# Patient Record
Sex: FEMALE | Race: WHITE | Employment: OTHER | URBAN - NONMETROPOLITAN AREA
[De-identification: names, ages, dates, MRNs, and addresses within clinical notes are randomized per-mention and may not be internally consistent; named-entity substitution may affect disease eponyms.]

---

## 2018-03-20 ENCOUNTER — APPOINTMENT (OUTPATIENT)
Dept: GENERAL RADIOLOGY | Age: 83
End: 2018-03-20
Payer: MEDICARE

## 2018-03-20 ENCOUNTER — HOSPITAL ENCOUNTER (EMERGENCY)
Age: 83
Discharge: HOME | End: 2018-03-20
Attending: EMERGENCY MEDICINE
Payer: MEDICARE

## 2018-03-20 VITALS
WEIGHT: 174 LBS | TEMPERATURE: 96.9 F | OXYGEN SATURATION: 95 % | DIASTOLIC BLOOD PRESSURE: 67 MMHG | RESPIRATION RATE: 16 BRPM | BODY MASS INDEX: 32.08 KG/M2 | SYSTOLIC BLOOD PRESSURE: 155 MMHG | HEART RATE: 63 BPM

## 2018-03-20 DIAGNOSIS — M25.552 LEFT HIP PAIN: Primary | ICD-10-CM

## 2018-03-20 PROCEDURE — 6370000000 HC RX 637 (ALT 250 FOR IP): Performed by: EMERGENCY MEDICINE

## 2018-03-20 PROCEDURE — 96375 TX/PRO/DX INJ NEW DRUG ADDON: CPT

## 2018-03-20 PROCEDURE — 73521 X-RAY EXAM HIPS BI 2 VIEWS: CPT

## 2018-03-20 PROCEDURE — 99284 EMERGENCY DEPT VISIT MOD MDM: CPT

## 2018-03-20 PROCEDURE — 96374 THER/PROPH/DIAG INJ IV PUSH: CPT

## 2018-03-20 PROCEDURE — 6360000002 HC RX W HCPCS: Performed by: EMERGENCY MEDICINE

## 2018-03-20 RX ORDER — HYDROCODONE BITARTRATE AND ACETAMINOPHEN 5; 325 MG/1; MG/1
1 TABLET ORAL EVERY 4 HOURS PRN
Qty: 10 TABLET | Refills: 0 | Status: SHIPPED | OUTPATIENT
Start: 2018-03-20 | End: 2018-03-23

## 2018-03-20 RX ORDER — HYDROCODONE BITARTRATE AND ACETAMINOPHEN 5; 325 MG/1; MG/1
1 TABLET ORAL ONCE
Status: COMPLETED | OUTPATIENT
Start: 2018-03-20 | End: 2018-03-20

## 2018-03-20 RX ORDER — ONDANSETRON 2 MG/ML
4 INJECTION INTRAMUSCULAR; INTRAVENOUS ONCE
Status: COMPLETED | OUTPATIENT
Start: 2018-03-20 | End: 2018-03-20

## 2018-03-20 RX ORDER — MORPHINE SULFATE 2 MG/ML
2 INJECTION, SOLUTION INTRAMUSCULAR; INTRAVENOUS ONCE
Status: COMPLETED | OUTPATIENT
Start: 2018-03-20 | End: 2018-03-20

## 2018-03-20 RX ADMIN — MORPHINE SULFATE 2 MG: 2 INJECTION, SOLUTION INTRAMUSCULAR; INTRAVENOUS at 09:02

## 2018-03-20 RX ADMIN — HYDROCODONE BITARTRATE AND ACETAMINOPHEN 1 TABLET: 5; 325 TABLET ORAL at 11:14

## 2018-03-20 RX ADMIN — ONDANSETRON 4 MG: 2 INJECTION INTRAMUSCULAR; INTRAVENOUS at 09:04

## 2018-03-20 ASSESSMENT — ENCOUNTER SYMPTOMS
VOMITING: 0
WHEEZING: 0
CONSTIPATION: 0
ABDOMINAL PAIN: 0
BACK PAIN: 0
COUGH: 0
FACIAL SWELLING: 0
CHEST TIGHTNESS: 0
DIARRHEA: 0
ABDOMINAL DISTENTION: 0
NAUSEA: 1
COLOR CHANGE: 0
SHORTNESS OF BREATH: 0

## 2018-03-20 ASSESSMENT — PAIN DESCRIPTION - DESCRIPTORS: DESCRIPTORS: SPASM

## 2018-03-20 ASSESSMENT — PAIN SCALES - GENERAL
PAINLEVEL_OUTOF10: 6
PAINLEVEL_OUTOF10: 10

## 2018-03-20 ASSESSMENT — PAIN DESCRIPTION - PAIN TYPE: TYPE: ACUTE PAIN

## 2018-03-20 ASSESSMENT — PAIN DESCRIPTION - LOCATION: LOCATION: HIP

## 2018-03-20 ASSESSMENT — PAIN DESCRIPTION - ORIENTATION: ORIENTATION: LEFT

## 2018-03-24 ENCOUNTER — HOSPITAL ENCOUNTER (EMERGENCY)
Age: 83
Discharge: ANOTHER HOSPITAL | End: 2018-03-24
Attending: EMERGENCY MEDICINE
Payer: MEDICARE

## 2018-03-24 ENCOUNTER — APPOINTMENT (OUTPATIENT)
Dept: GENERAL RADIOLOGY | Age: 83
End: 2018-03-24
Payer: MEDICARE

## 2018-03-24 ENCOUNTER — HOSPITAL ENCOUNTER (INPATIENT)
Age: 83
LOS: 3 days | Discharge: HOME | DRG: 247 | End: 2018-03-27
Attending: INTERNAL MEDICINE | Admitting: INTERNAL MEDICINE
Payer: MEDICARE

## 2018-03-24 VITALS
WEIGHT: 180 LBS | TEMPERATURE: 96.1 F | RESPIRATION RATE: 18 BRPM | DIASTOLIC BLOOD PRESSURE: 49 MMHG | OXYGEN SATURATION: 95 % | HEART RATE: 91 BPM | BODY MASS INDEX: 33.19 KG/M2 | SYSTOLIC BLOOD PRESSURE: 119 MMHG

## 2018-03-24 DIAGNOSIS — J81.0 ACUTE PULMONARY EDEMA (HCC): ICD-10-CM

## 2018-03-24 DIAGNOSIS — R07.9 CHEST PAIN, UNSPECIFIED TYPE: Primary | ICD-10-CM

## 2018-03-24 PROBLEM — I24.9 ACUTE CORONARY SYNDROME (HCC): Status: ACTIVE | Noted: 2018-03-24

## 2018-03-24 LAB
ABSOLUTE EOS #: <0.03 K/UL (ref 0–0.44)
ABSOLUTE IMMATURE GRANULOCYTE: 0.06 K/UL (ref 0–0.3)
ABSOLUTE LYMPH #: 2.75 K/UL (ref 1.1–3.7)
ABSOLUTE MONO #: 1.04 K/UL (ref 0.1–1.2)
ANION GAP SERPL CALCULATED.3IONS-SCNC: 19 MMOL/L (ref 9–17)
BASOPHILS # BLD: 0 % (ref 0–2)
BASOPHILS ABSOLUTE: <0.03 K/UL (ref 0–0.2)
BNP INTERPRETATION: ABNORMAL
BNP INTERPRETATION: ABNORMAL
BUN BLDV-MCNC: 21 MG/DL (ref 8–23)
BUN/CREAT BLD: 14 (ref 9–20)
CALCIUM SERPL-MCNC: 10.1 MG/DL (ref 8.6–10.4)
CHLORIDE BLD-SCNC: 90 MMOL/L (ref 98–107)
CO2: 20 MMOL/L (ref 20–31)
CREAT SERPL-MCNC: 1.52 MG/DL (ref 0.5–0.9)
DIFFERENTIAL TYPE: ABNORMAL
EKG ATRIAL RATE: 112 BPM
EKG ATRIAL RATE: 140 BPM
EKG P AXIS: 43 DEGREES
EKG P AXIS: 44 DEGREES
EKG P-R INTERVAL: 174 MS
EKG P-R INTERVAL: 196 MS
EKG Q-T INTERVAL: 316 MS
EKG Q-T INTERVAL: 352 MS
EKG QRS DURATION: 102 MS
EKG QRS DURATION: 98 MS
EKG QTC CALCULATION (BAZETT): 431 MS
EKG QTC CALCULATION (BAZETT): 537 MS
EKG R AXIS: -12 DEGREES
EKG R AXIS: -21 DEGREES
EKG T AXIS: 117 DEGREES
EKG T AXIS: 96 DEGREES
EKG VENTRICULAR RATE: 112 BPM
EKG VENTRICULAR RATE: 140 BPM
EOSINOPHILS RELATIVE PERCENT: 0 % (ref 1–4)
GFR AFRICAN AMERICAN: 40 ML/MIN
GFR NON-AFRICAN AMERICAN: 33 ML/MIN
GFR SERPL CREATININE-BSD FRML MDRD: ABNORMAL ML/MIN/{1.73_M2}
GFR SERPL CREATININE-BSD FRML MDRD: ABNORMAL ML/MIN/{1.73_M2}
GLUCOSE BLD-MCNC: 119 MG/DL (ref 65–105)
GLUCOSE BLD-MCNC: 134 MG/DL (ref 65–105)
GLUCOSE BLD-MCNC: 144 MG/DL (ref 65–105)
GLUCOSE BLD-MCNC: 190 MG/DL (ref 65–105)
GLUCOSE BLD-MCNC: 235 MG/DL (ref 65–105)
GLUCOSE BLD-MCNC: 364 MG/DL (ref 74–100)
GLUCOSE BLD-MCNC: 378 MG/DL (ref 74–100)
GLUCOSE BLD-MCNC: 404 MG/DL (ref 70–99)
HCT VFR BLD CALC: 30.5 % (ref 36.3–47.1)
HCT VFR BLD CALC: 35.6 % (ref 36.3–47.1)
HEMOGLOBIN: 11.4 G/DL (ref 11.9–15.1)
HEMOGLOBIN: 9.8 G/DL (ref 11.9–15.1)
IMMATURE GRANULOCYTES: 0 %
INR BLD: 1 (ref 0.9–1.2)
LV EF: 30 %
LVEF MODALITY: NORMAL
LYMPHOCYTES # BLD: 17 % (ref 24–43)
MAGNESIUM: 1.7 MG/DL (ref 1.6–2.6)
MCH RBC QN AUTO: 30.2 PG (ref 25.2–33.5)
MCH RBC QN AUTO: 30.4 PG (ref 25.2–33.5)
MCHC RBC AUTO-ENTMCNC: 32 G/DL (ref 28.4–34.8)
MCHC RBC AUTO-ENTMCNC: 32.1 G/DL (ref 28.4–34.8)
MCV RBC AUTO: 93.8 FL (ref 82.6–102.9)
MCV RBC AUTO: 94.9 FL (ref 82.6–102.9)
MONOCYTES # BLD: 7 % (ref 3–12)
NRBC AUTOMATED: 0 PER 100 WBC
NRBC AUTOMATED: 0 PER 100 WBC
PARTIAL THROMBOPLASTIN TIME: 22.3 SEC (ref 20.5–30.5)
PARTIAL THROMBOPLASTIN TIME: 25 SEC (ref 23.2–34.4)
PARTIAL THROMBOPLASTIN TIME: 90.9 SEC (ref 20.5–30.5)
PDW BLD-RTO: 12.4 % (ref 11.8–14.4)
PDW BLD-RTO: 12.4 % (ref 11.8–14.4)
PLATELET # BLD: 249 K/UL (ref 138–453)
PLATELET # BLD: 364 K/UL (ref 138–453)
PLATELET ESTIMATE: ABNORMAL
PMV BLD AUTO: 10 FL (ref 8.1–13.5)
PMV BLD AUTO: 9.5 FL (ref 8.1–13.5)
POTASSIUM SERPL-SCNC: 4.9 MMOL/L (ref 3.7–5.3)
PRO-BNP: 9595 PG/ML
PRO-BNP: ABNORMAL PG/ML
PROTHROMBIN TIME: 10.9 SEC (ref 9.7–12.2)
RBC # BLD: 3.25 M/UL (ref 3.95–5.11)
RBC # BLD: 3.75 M/UL (ref 3.95–5.11)
RBC # BLD: ABNORMAL 10*6/UL
SEG NEUTROPHILS: 76 % (ref 36–65)
SEGMENTED NEUTROPHILS ABSOLUTE COUNT: 11.89 K/UL (ref 1.5–8.1)
SODIUM BLD-SCNC: 129 MMOL/L (ref 135–144)
TROPONIN INTERP: ABNORMAL
TROPONIN T: 0.14 NG/ML
TROPONIN T: 1.13 NG/ML
TROPONIN T: 1.44 NG/ML
WBC # BLD: 14.4 K/UL (ref 3.5–11.3)
WBC # BLD: 15.8 K/UL (ref 3.5–11.3)
WBC # BLD: ABNORMAL 10*3/UL

## 2018-03-24 PROCEDURE — 6360000002 HC RX W HCPCS: Performed by: INTERNAL MEDICINE

## 2018-03-24 PROCEDURE — 71045 X-RAY EXAM CHEST 1 VIEW: CPT

## 2018-03-24 PROCEDURE — 85730 THROMBOPLASTIN TIME PARTIAL: CPT

## 2018-03-24 PROCEDURE — 82947 ASSAY GLUCOSE BLOOD QUANT: CPT

## 2018-03-24 PROCEDURE — 36415 COLL VENOUS BLD VENIPUNCTURE: CPT

## 2018-03-24 PROCEDURE — 6370000000 HC RX 637 (ALT 250 FOR IP): Performed by: INTERNAL MEDICINE

## 2018-03-24 PROCEDURE — 85027 COMPLETE CBC AUTOMATED: CPT

## 2018-03-24 PROCEDURE — 96375 TX/PRO/DX INJ NEW DRUG ADDON: CPT

## 2018-03-24 PROCEDURE — 85610 PROTHROMBIN TIME: CPT

## 2018-03-24 PROCEDURE — 96374 THER/PROPH/DIAG INJ IV PUSH: CPT

## 2018-03-24 PROCEDURE — 85025 COMPLETE CBC W/AUTO DIFF WBC: CPT

## 2018-03-24 PROCEDURE — 84484 ASSAY OF TROPONIN QUANT: CPT

## 2018-03-24 PROCEDURE — 99285 EMERGENCY DEPT VISIT HI MDM: CPT

## 2018-03-24 PROCEDURE — 83880 ASSAY OF NATRIURETIC PEPTIDE: CPT

## 2018-03-24 PROCEDURE — 96372 THER/PROPH/DIAG INJ SC/IM: CPT

## 2018-03-24 PROCEDURE — 2060000000 HC ICU INTERMEDIATE R&B

## 2018-03-24 PROCEDURE — 93005 ELECTROCARDIOGRAM TRACING: CPT

## 2018-03-24 PROCEDURE — 6360000002 HC RX W HCPCS: Performed by: STUDENT IN AN ORGANIZED HEALTH CARE EDUCATION/TRAINING PROGRAM

## 2018-03-24 PROCEDURE — 6370000000 HC RX 637 (ALT 250 FOR IP): Performed by: EMERGENCY MEDICINE

## 2018-03-24 PROCEDURE — 93306 TTE W/DOPPLER COMPLETE: CPT

## 2018-03-24 PROCEDURE — 6360000002 HC RX W HCPCS: Performed by: EMERGENCY MEDICINE

## 2018-03-24 PROCEDURE — 94762 N-INVAS EAR/PLS OXIMTRY CONT: CPT

## 2018-03-24 PROCEDURE — 80048 BASIC METABOLIC PNL TOTAL CA: CPT

## 2018-03-24 PROCEDURE — 83735 ASSAY OF MAGNESIUM: CPT

## 2018-03-24 RX ORDER — MORPHINE SULFATE 4 MG/ML
4 INJECTION, SOLUTION INTRAMUSCULAR; INTRAVENOUS EVERY 4 HOURS PRN
Status: DISCONTINUED | OUTPATIENT
Start: 2018-03-24 | End: 2018-03-24

## 2018-03-24 RX ORDER — ALBUTEROL SULFATE 90 UG/1
2 AEROSOL, METERED RESPIRATORY (INHALATION) EVERY 4 HOURS PRN
Status: DISCONTINUED | OUTPATIENT
Start: 2018-03-24 | End: 2018-03-27 | Stop reason: HOSPADM

## 2018-03-24 RX ORDER — FUROSEMIDE 10 MG/ML
80 INJECTION INTRAMUSCULAR; INTRAVENOUS ONCE
Status: COMPLETED | OUTPATIENT
Start: 2018-03-24 | End: 2018-03-24

## 2018-03-24 RX ORDER — HEPARIN SODIUM 1000 [USP'U]/ML
4000 INJECTION, SOLUTION INTRAVENOUS; SUBCUTANEOUS ONCE
Status: COMPLETED | OUTPATIENT
Start: 2018-03-24 | End: 2018-03-24

## 2018-03-24 RX ORDER — HEPARIN SODIUM 1000 [USP'U]/ML
2000 INJECTION, SOLUTION INTRAVENOUS; SUBCUTANEOUS PRN
Status: DISCONTINUED | OUTPATIENT
Start: 2018-03-24 | End: 2018-03-26

## 2018-03-24 RX ORDER — ONDANSETRON 2 MG/ML
4 INJECTION INTRAMUSCULAR; INTRAVENOUS ONCE
Status: COMPLETED | OUTPATIENT
Start: 2018-03-24 | End: 2018-03-24

## 2018-03-24 RX ORDER — ACETAMINOPHEN 325 MG/1
650 TABLET ORAL EVERY 4 HOURS PRN
Status: DISCONTINUED | OUTPATIENT
Start: 2018-03-24 | End: 2018-03-27 | Stop reason: HOSPADM

## 2018-03-24 RX ORDER — TROLAMINE SALICYLATE 10 G/100G
CREAM TOPICAL 2 TIMES DAILY PRN
Status: DISCONTINUED | OUTPATIENT
Start: 2018-03-24 | End: 2018-03-27 | Stop reason: HOSPADM

## 2018-03-24 RX ORDER — HEPARIN SODIUM 10000 [USP'U]/100ML
12 INJECTION, SOLUTION INTRAVENOUS CONTINUOUS
Status: DISCONTINUED | OUTPATIENT
Start: 2018-03-24 | End: 2018-03-26

## 2018-03-24 RX ORDER — FUROSEMIDE 10 MG/ML
20 INJECTION INTRAMUSCULAR; INTRAVENOUS ONCE
Status: COMPLETED | OUTPATIENT
Start: 2018-03-24 | End: 2018-03-24

## 2018-03-24 RX ORDER — SIMVASTATIN 40 MG
40 TABLET ORAL NIGHTLY
Status: DISCONTINUED | OUTPATIENT
Start: 2018-03-24 | End: 2018-03-26

## 2018-03-24 RX ORDER — LORAZEPAM 2 MG/ML
0.5 INJECTION INTRAMUSCULAR ONCE
Status: COMPLETED | OUTPATIENT
Start: 2018-03-24 | End: 2018-03-24

## 2018-03-24 RX ORDER — DEXTROSE MONOHYDRATE 25 G/50ML
12.5 INJECTION, SOLUTION INTRAVENOUS PRN
Status: DISCONTINUED | OUTPATIENT
Start: 2018-03-24 | End: 2018-03-27 | Stop reason: HOSPADM

## 2018-03-24 RX ORDER — METOPROLOL TARTRATE 50 MG/1
50 TABLET, FILM COATED ORAL DAILY
Status: DISCONTINUED | OUTPATIENT
Start: 2018-03-24 | End: 2018-03-27 | Stop reason: HOSPADM

## 2018-03-24 RX ORDER — PANTOPRAZOLE SODIUM 40 MG/1
40 TABLET, DELAYED RELEASE ORAL
Status: DISCONTINUED | OUTPATIENT
Start: 2018-03-24 | End: 2018-03-27 | Stop reason: HOSPADM

## 2018-03-24 RX ORDER — HEPARIN SODIUM 1000 [USP'U]/ML
4000 INJECTION, SOLUTION INTRAVENOUS; SUBCUTANEOUS PRN
Status: DISCONTINUED | OUTPATIENT
Start: 2018-03-24 | End: 2018-03-26

## 2018-03-24 RX ORDER — ASPIRIN 81 MG/1
81 TABLET, CHEWABLE ORAL DAILY
Status: DISCONTINUED | OUTPATIENT
Start: 2018-03-24 | End: 2018-03-27 | Stop reason: HOSPADM

## 2018-03-24 RX ORDER — NICOTINE POLACRILEX 4 MG
15 LOZENGE BUCCAL PRN
Status: DISCONTINUED | OUTPATIENT
Start: 2018-03-24 | End: 2018-03-27 | Stop reason: HOSPADM

## 2018-03-24 RX ORDER — MORPHINE SULFATE 4 MG/ML
4 INJECTION, SOLUTION INTRAMUSCULAR; INTRAVENOUS EVERY 4 HOURS PRN
Status: DISCONTINUED | OUTPATIENT
Start: 2018-03-24 | End: 2018-03-27 | Stop reason: HOSPADM

## 2018-03-24 RX ORDER — DEXTROSE MONOHYDRATE 50 MG/ML
100 INJECTION, SOLUTION INTRAVENOUS PRN
Status: DISCONTINUED | OUTPATIENT
Start: 2018-03-24 | End: 2018-03-27 | Stop reason: HOSPADM

## 2018-03-24 RX ORDER — MORPHINE SULFATE 2 MG/ML
2 INJECTION, SOLUTION INTRAMUSCULAR; INTRAVENOUS EVERY 4 HOURS PRN
Status: DISCONTINUED | OUTPATIENT
Start: 2018-03-24 | End: 2018-03-27 | Stop reason: HOSPADM

## 2018-03-24 RX ORDER — TRAMADOL HYDROCHLORIDE 50 MG/1
50 TABLET ORAL EVERY 6 HOURS PRN
Status: DISCONTINUED | OUTPATIENT
Start: 2018-03-24 | End: 2018-03-24

## 2018-03-24 RX ORDER — ACETAMINOPHEN 325 MG/1
650 TABLET ORAL ONCE
Status: COMPLETED | OUTPATIENT
Start: 2018-03-24 | End: 2018-03-24

## 2018-03-24 RX ADMIN — INSULIN LISPRO 1 UNITS: 100 INJECTION, SOLUTION INTRAVENOUS; SUBCUTANEOUS at 22:50

## 2018-03-24 RX ADMIN — ONDANSETRON 4 MG: 2 INJECTION INTRAMUSCULAR; INTRAVENOUS at 00:53

## 2018-03-24 RX ADMIN — HEPARIN SODIUM 4000 UNITS: 1000 INJECTION, SOLUTION INTRAVENOUS; SUBCUTANEOUS at 11:28

## 2018-03-24 RX ADMIN — METOPROLOL TARTRATE 25 MG: 50 TABLET, FILM COATED ORAL at 11:59

## 2018-03-24 RX ADMIN — FUROSEMIDE 80 MG: 10 INJECTION, SOLUTION INTRAMUSCULAR; INTRAVENOUS at 11:42

## 2018-03-24 RX ADMIN — SIMVASTATIN 40 MG: 40 TABLET, FILM COATED ORAL at 20:22

## 2018-03-24 RX ADMIN — ACETAMINOPHEN 650 MG: 325 TABLET, FILM COATED ORAL at 03:15

## 2018-03-24 RX ADMIN — HEPARIN SODIUM AND DEXTROSE 12 UNITS/KG/HR: 10000; 5 INJECTION INTRAVENOUS at 11:30

## 2018-03-24 RX ADMIN — MORPHINE SULFATE 4 MG: 4 INJECTION INTRAVENOUS at 16:34

## 2018-03-24 RX ADMIN — LORAZEPAM 0.5 MG: 2 INJECTION INTRAMUSCULAR; INTRAVENOUS at 00:56

## 2018-03-24 RX ADMIN — ACETAMINOPHEN 650 MG: 325 TABLET ORAL at 08:43

## 2018-03-24 RX ADMIN — PANTOPRAZOLE SODIUM 40 MG: 40 TABLET, DELAYED RELEASE ORAL at 11:59

## 2018-03-24 RX ADMIN — FUROSEMIDE 20 MG: 10 INJECTION, SOLUTION INTRAMUSCULAR; INTRAVENOUS at 01:50

## 2018-03-24 RX ADMIN — ACETAMINOPHEN 650 MG: 325 TABLET ORAL at 14:41

## 2018-03-24 RX ADMIN — TRAMADOL HYDROCHLORIDE 50 MG: 50 TABLET, FILM COATED ORAL at 13:40

## 2018-03-24 RX ADMIN — TROLAMINE SALICYLATE: 8.5 CREAM TOPICAL at 15:28

## 2018-03-24 RX ADMIN — INSULIN HUMAN 8 UNITS: 100 INJECTION, SOLUTION PARENTERAL at 01:50

## 2018-03-24 ASSESSMENT — PAIN SCALES - GENERAL
PAINLEVEL_OUTOF10: 4
PAINLEVEL_OUTOF10: 6
PAINLEVEL_OUTOF10: 5
PAINLEVEL_OUTOF10: 10
PAINLEVEL_OUTOF10: 6

## 2018-03-24 ASSESSMENT — PAIN DESCRIPTION - PAIN TYPE
TYPE: ACUTE PAIN
TYPE: ACUTE PAIN

## 2018-03-24 ASSESSMENT — PAIN DESCRIPTION - LOCATION
LOCATION: CHEST
LOCATION: CHEST

## 2018-03-24 NOTE — H&P
[facility]    Consult / H&P               Today's Date: 3/24/2018  Patient Name: Andrea Yang  Date of admission: 3/24/2018  4:44 AM  Patient's age: 81 y.o., 1935  Admission Dx: Acute coronary syndrome ([facility] [address] [I24.9]    Reason for Consult:  Cardiac evaluation    Requesting Physician: Isa Pineda MD    CHIEF COMPLAINT:  Chest pain/sob    History Obtained From:  patient, electronic medical record    HISTORY OF PRESENT ILLNESS:      The patient is a 81 y.o.  female who is admitted to the hospital for Chest Pain and shortness of breath. Andrea Yang complains of chest pain. Onset was 1 day ago. Symptoms have improved since that time. The patient's pain was constant. The patient describes the pain as sharp and does not radiate. Intensity 10/10. Associated symptoms are: shortness of breath and nausea. Aggravating factors are: movement or exertion. She reports symptoms are similar to previous heart attack. Patient's cardiac risk factors are: advanced age (older than 54 for men, 72 for women), diabetes mellitus, dyslipidemia, hypertension and obesity (BMI >= 30 kg/m2). Upon EMS arrival- patient was hypoxic in low 80s. At [place] [address] ED, patient was placed on biPAP and saturations improved. CXR showing interstitial edema vs nonspecific postinflammatory change. Labs show Na 129, Cr 1.52, proBNP 9595, troponin 0.14. EKG showing sinus tachy w/ PVC, LVH, Q waves in inferior leads. Patient has history of CAD s/p 2 stents (2002). Follows with Dr. Chong Ricketts. Per patient, last stress test in November 2017 which was normal.     Past Medical History:   has a past medical history of Arthritis; CAD (coronary artery disease); Diabetes mellitus ([facility] [address] GERD (gastroesophageal reflux disease); Hyperlipidemia; Hypertension; and Unspecified cerebral artery occlusion with cerebral infarction.     Past Surgical History:   has a past surgical history that includes Leg Surgery (Bilateral); · Constitutional: no fevers/chills      · Eyes: No visual changes or diplopia. No scleral icterus. · ENT: No Headaches  · Cardiovascular: +chest pain  · Respiratory: +sob No previous pulmonary problems, No cough  · Gastrointestinal: +nausea No abdominal pain. No change in bowel or bladder habits. · Genitourinary: No dysuria, trouble voiding, or hematuria. · Musculoskeletal: +left leg pain No gait disturbance, No weakness or joint complaints. · Integumentary: No rash or pruritis. · Neurological: No headache, diplopia, change in muscle strength, numbness or tingling. · Psychiatric: No anxiety, or depression. · Endocrine: No temperature intolerance. No excessive thirst, fluid intake, or urination. No tremor. · Hematologic/Lymphatic: No abnormal bruising or bleeding, blood clots or swollen lymph nodes. · Allergic/Immunologic: No nasal congestion or hives. PHYSICAL EXAM:      /85   Pulse 89   Temp 98 °F (36.7 °C) (Oral)   Resp 17   Ht 5' 5\" (1.651 m)   Wt 183 lb 1 oz (83 kg)   SpO2 99%   BMI 30.46 kg/m²    Constitutional and General Appearance: alert, cooperative, no distress and appears stated age  HEENT: PERRL, mucous membranes dry  Respiratory:  · Normal excursion and expansion without use of accessory muscles  · Resp Auscultation: Good respiratory effort. No for increased work of breathing. · Basilar crackles bilaterally  Cardiovascular:  · Heart tones are crisp and normal. regular S1 and S2  · Chest tender to palpation. · Peripheral pulses are symmetrical and full   Abdomen:   · No masses or tenderness  · Bowel sounds present  Extremities:  ·  No Cyanosis or Clubbing  ·  + bilateral lower extremity edema  ·  Skin: Warm and dry  Neurological:  · Alert and oriented. · Moves all extremities well  · No abnormalities of mood, affect, memory, mentation, or behavior are noted    DATA:    Diagnostics:    EKG: NSR, LVH, Q waves inferior leads  ECHO: ordered, but not yet obtained.      Stress

## 2018-03-24 NOTE — ED NOTES
Per Dr Stanley Wells not given at this time due to pt's BP of 106/54.        Paty Sen, RN  03/24/18 0157

## 2018-03-24 NOTE — ED PROVIDER NOTES
[facility]  Emergency Medicine Department    Pt Name: Brittani Colmenares  MRN: [id number]  [place] 1935  Date of evaluation: 3/24/2018  Provider: Jeffery Genao MD    CHIEF COMPLAINT       Chief Complaint   Patient presents with    Chest Pain     onsest around 11 pm       HISTORY OF PRESENT ILLNESS  (Location/Symptom, Timing/Onset, Context/Setting, Quality, Duration, Modifying Factors, Severity.)   Brittani Colmenares is a 81 y.o. female with a history of coronary artery disease who presents to the emergency department with sudden onset shortness of breath and chest pain that started around 11 PM.  She arrives by EMS. She was initially found to be hypoxic in the low 80s which improved after an albuterol breathing treatment. She has no history of COPD or CHF. She reports chest pain that feels like when she had a heart attack about 15 years ago. She took aspirin earlier in the evening. She reports her chest pain is a 10 out of 10. She denies any fevers or coughing. Nursing Notes were reviewed. ALLERGIES     Patient has no known allergies.     CURRENT MEDICATIONS       Previous Medications    ASPIRIN 81 MG TABLET    Take 81 mg by mouth daily    ESOMEPRAZOLE MAGNESIUM (NEXIUM) 40 MG PACK    Take 40 mg by mouth daily    GLIPIZIDE (GLUCOTROL) 5 MG TABLET    Take 5 mg by mouth daily    METFORMIN (GLUCOPHAGE) 1000 MG TABLET    Take 1,000 mg by mouth daily (with breakfast)    METOPROLOL (TOPROL-XL) 50 MG XL TABLET    Take 50 mg by mouth daily    SIMVASTATIN (ZOCOR) 40 MG TABLET    Take 40 mg by mouth nightly       PAST MEDICAL HISTORY         Diagnosis Date    Arthritis     CAD (coronary artery disease)     Diabetes mellitus ([address]     GERD (gastroesophageal reflux disease)     Hyperlipidemia     Hypertension     Unspecified cerebral artery occlusion with cerebral infarction 2003       SURGICAL HISTORY           Procedure Laterality Date    CARDIAC SURGERY      stents    CHOLECYSTECTOMY normal range or not returned as of this dictation. EMERGENCY DEPARTMENT COURSE and DIFFERENTIAL DIAGNOSIS/MDM:   Vitals:    Vitals:    03/24/18 0040 03/24/18 0042 03/24/18 0049 03/24/18 0101   BP: 134/74  (!) 146/80    Pulse: 133  118    Resp: 24 28 22    Temp:    96.1 °F (35.6 °C)   TempSrc:    Tympanic   SpO2: 97%  100%    Weight:         81-year-old female with a history of coronary artery disease but with no history of CHF presenting with sudden onset chest pain and shortness of breath. EKG initially showed tachycardia with frequent PVCs. After being placed on BiPAP, her heart rate improved and EKG became more regular with findings consistent with LVH but no acute STEMI. Her chest pain improved after a dose of Ativan and with BiPAP her O2 saturations improved from the low 80s to 100%. Her pulmonary exam and chest x-ray are consistent with pulmonary edema. Her troponin is mildly elevated which I believe is secondary to demand but an STEMI cannot be ruled out. She is oriented and treated with a dose of aspirin at home. Discussed her EKG and clinical presentation with Dr. Cheikh Awan at [facility]. [facility] who reviewed the EKGs and agrees to accept the patient at [facility].    CONSULTS:  None    CRITICAL CARE TIME     Due to the high probability of sudden and clinically significant deterioration in the patient's condition she required highest level of my preparedness to intervene urgently. I provided critical care time including documentation time, medication orders and management, reevaluation, vital sign assessment, ordering and reviewing of of lab tests ordering and reviewing of x-ray studies, and admission orders. Aggregate critical care time is between 60-75 minutes including only time during which I was engaged in work directly related to her care and did not include time spent treating other patients simultaneously. FINAL IMPRESSION      1. Chest pain, unspecified type    2.  Acute pulmonary edema

## 2018-03-24 NOTE — ED NOTES
[facility] in department, listened to report as this writer was giving it to [facility]. V's.   Pt report given to Oj at [facility], RN  03/24/18 6397

## 2018-03-24 NOTE — ED NOTES
Pt states that she has a headache and is requesting some Tylenol.   Dr Micheline Castrejon made aware     Giselle Varma, ZENON  03/24/18 7864

## 2018-03-25 LAB
CHOLESTEROL, FASTING: 143 MG/DL
CHOLESTEROL/HDL RATIO: 2
EKG ATRIAL RATE: 74 BPM
EKG P AXIS: 46 DEGREES
EKG P-R INTERVAL: 168 MS
EKG Q-T INTERVAL: 450 MS
EKG QRS DURATION: 102 MS
EKG QTC CALCULATION (BAZETT): 499 MS
EKG R AXIS: -5 DEGREES
EKG T AXIS: 163 DEGREES
EKG VENTRICULAR RATE: 74 BPM
GLUCOSE BLD-MCNC: 159 MG/DL (ref 65–105)
GLUCOSE BLD-MCNC: 161 MG/DL (ref 65–105)
GLUCOSE BLD-MCNC: 181 MG/DL (ref 65–105)
GLUCOSE BLD-MCNC: 186 MG/DL (ref 65–105)
HDLC SERPL-MCNC: 71 MG/DL
LDL CHOLESTEROL: 48 MG/DL (ref 0–130)
PARTIAL THROMBOPLASTIN TIME: 41.7 SEC (ref 20.5–30.5)
PARTIAL THROMBOPLASTIN TIME: 44.6 SEC (ref 20.5–30.5)
PARTIAL THROMBOPLASTIN TIME: 48.4 SEC (ref 20.5–30.5)
PARTIAL THROMBOPLASTIN TIME: 78.7 SEC (ref 20.5–30.5)
TRIGLYCERIDE, FASTING: 122 MG/DL
TROPONIN INTERP: ABNORMAL
TROPONIN T: 1.13 NG/ML
TROPONIN T: 1.18 NG/ML
TROPONIN T: 1.2 NG/ML
VLDLC SERPL CALC-MCNC: NORMAL MG/DL (ref 1–30)

## 2018-03-25 PROCEDURE — 6370000000 HC RX 637 (ALT 250 FOR IP): Performed by: INTERNAL MEDICINE

## 2018-03-25 PROCEDURE — 93005 ELECTROCARDIOGRAM TRACING: CPT

## 2018-03-25 PROCEDURE — 2060000000 HC ICU INTERMEDIATE R&B

## 2018-03-25 PROCEDURE — 80061 LIPID PANEL: CPT

## 2018-03-25 PROCEDURE — 36415 COLL VENOUS BLD VENIPUNCTURE: CPT

## 2018-03-25 PROCEDURE — 6360000002 HC RX W HCPCS: Performed by: STUDENT IN AN ORGANIZED HEALTH CARE EDUCATION/TRAINING PROGRAM

## 2018-03-25 PROCEDURE — 6360000002 HC RX W HCPCS: Performed by: INTERNAL MEDICINE

## 2018-03-25 PROCEDURE — 82947 ASSAY GLUCOSE BLOOD QUANT: CPT

## 2018-03-25 PROCEDURE — 84484 ASSAY OF TROPONIN QUANT: CPT

## 2018-03-25 PROCEDURE — 85730 THROMBOPLASTIN TIME PARTIAL: CPT

## 2018-03-25 RX ORDER — ONDANSETRON 2 MG/ML
4 INJECTION INTRAMUSCULAR; INTRAVENOUS EVERY 6 HOURS PRN
Status: DISCONTINUED | OUTPATIENT
Start: 2018-03-25 | End: 2018-03-27 | Stop reason: HOSPADM

## 2018-03-25 RX ORDER — PROMETHAZINE HYDROCHLORIDE 25 MG/1
25 TABLET ORAL EVERY 6 HOURS PRN
Status: DISCONTINUED | OUTPATIENT
Start: 2018-03-25 | End: 2018-03-27 | Stop reason: HOSPADM

## 2018-03-25 RX ADMIN — HEPARIN SODIUM 2000 UNITS: 1000 INJECTION, SOLUTION INTRAVENOUS; SUBCUTANEOUS at 18:38

## 2018-03-25 RX ADMIN — ONDANSETRON 4 MG: 2 INJECTION INTRAMUSCULAR; INTRAVENOUS at 00:25

## 2018-03-25 RX ADMIN — HEPARIN SODIUM AND DEXTROSE 11.04 UNITS/KG/HR: 10000; 5 INJECTION INTRAVENOUS at 18:31

## 2018-03-25 RX ADMIN — PROMETHAZINE HYDROCHLORIDE 25 MG: 25 TABLET ORAL at 12:40

## 2018-03-25 RX ADMIN — INSULIN LISPRO 1 UNITS: 100 INJECTION, SOLUTION INTRAVENOUS; SUBCUTANEOUS at 21:12

## 2018-03-25 RX ADMIN — MORPHINE SULFATE 2 MG: 4 INJECTION INTRAVENOUS at 11:58

## 2018-03-25 RX ADMIN — ASPIRIN 81 MG: 81 TABLET, CHEWABLE ORAL at 08:35

## 2018-03-25 RX ADMIN — HEPARIN SODIUM 2000 UNITS: 1000 INJECTION, SOLUTION INTRAVENOUS; SUBCUTANEOUS at 00:39

## 2018-03-25 RX ADMIN — METOPROLOL TARTRATE 50 MG: 50 TABLET, FILM COATED ORAL at 08:35

## 2018-03-25 RX ADMIN — MORPHINE SULFATE 2 MG: 2 INJECTION, SOLUTION INTRAMUSCULAR; INTRAVENOUS at 01:19

## 2018-03-25 RX ADMIN — ONDANSETRON 4 MG: 2 INJECTION INTRAMUSCULAR; INTRAVENOUS at 07:18

## 2018-03-25 RX ADMIN — SIMVASTATIN 40 MG: 40 TABLET, FILM COATED ORAL at 21:11

## 2018-03-25 RX ADMIN — PANTOPRAZOLE SODIUM 40 MG: 40 TABLET, DELAYED RELEASE ORAL at 07:15

## 2018-03-25 RX ADMIN — INSULIN LISPRO 2 UNITS: 100 INJECTION, SOLUTION INTRAVENOUS; SUBCUTANEOUS at 08:37

## 2018-03-25 RX ADMIN — INSULIN LISPRO 2 UNITS: 100 INJECTION, SOLUTION INTRAVENOUS; SUBCUTANEOUS at 11:58

## 2018-03-25 RX ADMIN — HEPARIN SODIUM AND DEXTROSE 9.04 UNITS/KG/HR: 10000; 5 INJECTION INTRAVENOUS at 14:25

## 2018-03-25 ASSESSMENT — PAIN DESCRIPTION - PAIN TYPE
TYPE: ACUTE PAIN
TYPE: ACUTE PAIN
TYPE: OTHER (COMMENT)

## 2018-03-25 ASSESSMENT — PAIN DESCRIPTION - ORIENTATION
ORIENTATION: RIGHT
ORIENTATION: LEFT

## 2018-03-25 ASSESSMENT — PAIN DESCRIPTION - ONSET
ONSET: SUDDEN
ONSET: SUDDEN

## 2018-03-25 ASSESSMENT — PAIN DESCRIPTION - FREQUENCY
FREQUENCY: INTERMITTENT
FREQUENCY: INTERMITTENT

## 2018-03-25 ASSESSMENT — PAIN SCALES - GENERAL
PAINLEVEL_OUTOF10: 6
PAINLEVEL_OUTOF10: 5
PAINLEVEL_OUTOF10: 7
PAINLEVEL_OUTOF10: 5
PAINLEVEL_OUTOF10: 3
PAINLEVEL_OUTOF10: 5

## 2018-03-25 ASSESSMENT — PAIN DESCRIPTION - DESCRIPTORS
DESCRIPTORS: ACHING
DESCRIPTORS: ACHING

## 2018-03-25 ASSESSMENT — PAIN DESCRIPTION - LOCATION
LOCATION: EAR
LOCATION: LEG

## 2018-03-26 ENCOUNTER — APPOINTMENT (OUTPATIENT)
Dept: CARDIAC CATH/INVASIVE PROCEDURES | Age: 83
DRG: 247 | End: 2018-03-26
Attending: INTERNAL MEDICINE
Payer: MEDICARE

## 2018-03-26 PROBLEM — Z95.5 S/P DRUG ELUTING CORONARY STENT PLACEMENT: Status: ACTIVE | Noted: 2018-03-26

## 2018-03-26 LAB
ACTIVATED CLOTTING TIME: 176 SEC (ref 79–149)
ACTIVATED CLOTTING TIME: 270 SEC (ref 79–149)
ANION GAP SERPL CALCULATED.3IONS-SCNC: 13 MMOL/L (ref 9–17)
BUN BLDV-MCNC: 31 MG/DL (ref 8–23)
BUN/CREAT BLD: ABNORMAL (ref 9–20)
CALCIUM SERPL-MCNC: 8.8 MG/DL (ref 8.6–10.4)
CHLORIDE BLD-SCNC: 96 MMOL/L (ref 98–107)
CO2: 25 MMOL/L (ref 20–31)
CREAT SERPL-MCNC: 1.59 MG/DL (ref 0.5–0.9)
GFR AFRICAN AMERICAN: 38 ML/MIN
GFR NON-AFRICAN AMERICAN: 31 ML/MIN
GFR SERPL CREATININE-BSD FRML MDRD: ABNORMAL ML/MIN/{1.73_M2}
GFR SERPL CREATININE-BSD FRML MDRD: ABNORMAL ML/MIN/{1.73_M2}
GLUCOSE BLD-MCNC: 151 MG/DL (ref 70–99)
GLUCOSE BLD-MCNC: 163 MG/DL (ref 65–105)
GLUCOSE BLD-MCNC: 163 MG/DL (ref 65–105)
GLUCOSE BLD-MCNC: 208 MG/DL (ref 65–105)
HCT VFR BLD CALC: 28.8 % (ref 36.3–47.1)
HEMOGLOBIN: 9.2 G/DL (ref 11.9–15.1)
MCH RBC QN AUTO: 30.2 PG (ref 25.2–33.5)
MCHC RBC AUTO-ENTMCNC: 31.9 G/DL (ref 28.4–34.8)
MCV RBC AUTO: 94.4 FL (ref 82.6–102.9)
NRBC AUTOMATED: 0 PER 100 WBC
PARTIAL THROMBOPLASTIN TIME: 24.6 SEC (ref 20.5–30.5)
PARTIAL THROMBOPLASTIN TIME: 55.1 SEC (ref 20.5–30.5)
PARTIAL THROMBOPLASTIN TIME: 55.2 SEC (ref 20.5–30.5)
PARTIAL THROMBOPLASTIN TIME: >120 SEC (ref 20.5–30.5)
PDW BLD-RTO: 12.5 % (ref 11.8–14.4)
PLATELET # BLD: 219 K/UL (ref 138–453)
PLATELET # BLD: 227 K/UL (ref 138–453)
PMV BLD AUTO: 10.5 FL (ref 8.1–13.5)
POTASSIUM SERPL-SCNC: 4.2 MMOL/L (ref 3.7–5.3)
RBC # BLD: 3.05 M/UL (ref 3.95–5.11)
SODIUM BLD-SCNC: 134 MMOL/L (ref 135–144)
TROPONIN INTERP: ABNORMAL
TROPONIN T: 1.36 NG/ML
TROPONIN T: 1.4 NG/ML
TROPONIN T: 1.44 NG/ML
TROPONIN T: 1.48 NG/ML
TROPONIN T: 1.48 NG/ML
WBC # BLD: 8.9 K/UL (ref 3.5–11.3)

## 2018-03-26 PROCEDURE — 84484 ASSAY OF TROPONIN QUANT: CPT

## 2018-03-26 PROCEDURE — 36415 COLL VENOUS BLD VENIPUNCTURE: CPT

## 2018-03-26 PROCEDURE — 85027 COMPLETE CBC AUTOMATED: CPT

## 2018-03-26 PROCEDURE — C1769 GUIDE WIRE: HCPCS

## 2018-03-26 PROCEDURE — 93971 EXTREMITY STUDY: CPT

## 2018-03-26 PROCEDURE — 6360000002 HC RX W HCPCS: Performed by: INTERNAL MEDICINE

## 2018-03-26 PROCEDURE — C9600 PERC DRUG-EL COR STENT SING: HCPCS | Performed by: INTERNAL MEDICINE

## 2018-03-26 PROCEDURE — 2709999900 HC NON-CHARGEABLE SUPPLY

## 2018-03-26 PROCEDURE — B2151ZZ FLUOROSCOPY OF LEFT HEART USING LOW OSMOLAR CONTRAST: ICD-10-PCS | Performed by: INTERNAL MEDICINE

## 2018-03-26 PROCEDURE — 6370000000 HC RX 637 (ALT 250 FOR IP): Performed by: INTERNAL MEDICINE

## 2018-03-26 PROCEDURE — 85730 THROMBOPLASTIN TIME PARTIAL: CPT

## 2018-03-26 PROCEDURE — 027035Z DILATION OF CORONARY ARTERY, ONE ARTERY WITH TWO DRUG-ELUTING INTRALUMINAL DEVICES, PERCUTANEOUS APPROACH: ICD-10-PCS | Performed by: INTERNAL MEDICINE

## 2018-03-26 PROCEDURE — 7100000010 HC PHASE II RECOVERY - FIRST 15 MIN

## 2018-03-26 PROCEDURE — 6370000000 HC RX 637 (ALT 250 FOR IP)

## 2018-03-26 PROCEDURE — 82947 ASSAY GLUCOSE BLOOD QUANT: CPT

## 2018-03-26 PROCEDURE — 85347 COAGULATION TIME ACTIVATED: CPT

## 2018-03-26 PROCEDURE — B2111ZZ FLUOROSCOPY OF MULTIPLE CORONARY ARTERIES USING LOW OSMOLAR CONTRAST: ICD-10-PCS | Performed by: INTERNAL MEDICINE

## 2018-03-26 PROCEDURE — 2060000000 HC ICU INTERMEDIATE R&B

## 2018-03-26 PROCEDURE — 7100000011 HC PHASE II RECOVERY - ADDTL 15 MIN

## 2018-03-26 PROCEDURE — 80048 BASIC METABOLIC PNL TOTAL CA: CPT

## 2018-03-26 PROCEDURE — 6360000004 HC RX CONTRAST MEDICATION

## 2018-03-26 PROCEDURE — C1874 STENT, COATED/COV W/DEL SYS: HCPCS

## 2018-03-26 PROCEDURE — C1894 INTRO/SHEATH, NON-LASER: HCPCS

## 2018-03-26 PROCEDURE — C1887 CATHETER, GUIDING: HCPCS

## 2018-03-26 PROCEDURE — 6360000002 HC RX W HCPCS

## 2018-03-26 PROCEDURE — C1725 CATH, TRANSLUMIN NON-LASER: HCPCS

## 2018-03-26 PROCEDURE — 4A023N7 MEASUREMENT OF CARDIAC SAMPLING AND PRESSURE, LEFT HEART, PERCUTANEOUS APPROACH: ICD-10-PCS | Performed by: INTERNAL MEDICINE

## 2018-03-26 PROCEDURE — 2500000003 HC RX 250 WO HCPCS

## 2018-03-26 PROCEDURE — 2580000003 HC RX 258: Performed by: INTERNAL MEDICINE

## 2018-03-26 PROCEDURE — 94762 N-INVAS EAR/PLS OXIMTRY CONT: CPT

## 2018-03-26 PROCEDURE — 93458 L HRT ARTERY/VENTRICLE ANGIO: CPT | Performed by: INTERNAL MEDICINE

## 2018-03-26 RX ORDER — POLYETHYLENE GLYCOL 3350 17 G/17G
17 POWDER, FOR SOLUTION ORAL DAILY
Status: DISCONTINUED | OUTPATIENT
Start: 2018-03-26 | End: 2018-03-27 | Stop reason: HOSPADM

## 2018-03-26 RX ORDER — SODIUM CHLORIDE 0.9 % (FLUSH) 0.9 %
10 SYRINGE (ML) INJECTION PRN
Status: DISCONTINUED | OUTPATIENT
Start: 2018-03-26 | End: 2018-03-27 | Stop reason: HOSPADM

## 2018-03-26 RX ORDER — ONDANSETRON 2 MG/ML
4 INJECTION INTRAMUSCULAR; INTRAVENOUS EVERY 6 HOURS PRN
Status: DISCONTINUED | OUTPATIENT
Start: 2018-03-26 | End: 2018-03-27 | Stop reason: HOSPADM

## 2018-03-26 RX ORDER — ATORVASTATIN CALCIUM 40 MG/1
40 TABLET, FILM COATED ORAL NIGHTLY
Status: DISCONTINUED | OUTPATIENT
Start: 2018-03-26 | End: 2018-03-27 | Stop reason: HOSPADM

## 2018-03-26 RX ORDER — SODIUM CHLORIDE 9 MG/ML
INJECTION, SOLUTION INTRAVENOUS CONTINUOUS
Status: DISCONTINUED | OUTPATIENT
Start: 2018-03-26 | End: 2018-03-27 | Stop reason: HOSPADM

## 2018-03-26 RX ORDER — DOCUSATE SODIUM 100 MG/1
100 CAPSULE, LIQUID FILLED ORAL 2 TIMES DAILY
Status: DISCONTINUED | OUTPATIENT
Start: 2018-03-26 | End: 2018-03-27 | Stop reason: HOSPADM

## 2018-03-26 RX ORDER — CLOPIDOGREL BISULFATE 75 MG/1
75 TABLET ORAL DAILY
Status: DISCONTINUED | OUTPATIENT
Start: 2018-03-27 | End: 2018-03-27 | Stop reason: HOSPADM

## 2018-03-26 RX ORDER — SODIUM CHLORIDE 0.9 % (FLUSH) 0.9 %
10 SYRINGE (ML) INJECTION EVERY 12 HOURS SCHEDULED
Status: DISCONTINUED | OUTPATIENT
Start: 2018-03-26 | End: 2018-03-27 | Stop reason: HOSPADM

## 2018-03-26 RX ORDER — ACETAMINOPHEN 325 MG/1
650 TABLET ORAL EVERY 4 HOURS PRN
Status: DISCONTINUED | OUTPATIENT
Start: 2018-03-26 | End: 2018-03-27 | Stop reason: HOSPADM

## 2018-03-26 RX ADMIN — TROLAMINE SALICYLATE: 8.5 CREAM TOPICAL at 15:11

## 2018-03-26 RX ADMIN — MORPHINE SULFATE 2 MG: 2 INJECTION, SOLUTION INTRAMUSCULAR; INTRAVENOUS at 20:31

## 2018-03-26 RX ADMIN — INSULIN LISPRO 1 UNITS: 100 INJECTION, SOLUTION INTRAVENOUS; SUBCUTANEOUS at 20:32

## 2018-03-26 RX ADMIN — DESMOPRESSIN ACETATE 40 MG: 0.2 TABLET ORAL at 20:31

## 2018-03-26 RX ADMIN — ASPIRIN 81 MG: 81 TABLET, CHEWABLE ORAL at 10:17

## 2018-03-26 RX ADMIN — MORPHINE SULFATE 2 MG: 2 INJECTION, SOLUTION INTRAMUSCULAR; INTRAVENOUS at 15:21

## 2018-03-26 RX ADMIN — SODIUM CHLORIDE: 9 INJECTION, SOLUTION INTRAVENOUS at 14:06

## 2018-03-26 RX ADMIN — ONDANSETRON 4 MG: 2 INJECTION INTRAMUSCULAR; INTRAVENOUS at 14:23

## 2018-03-26 RX ADMIN — PANTOPRAZOLE SODIUM 40 MG: 40 TABLET, DELAYED RELEASE ORAL at 10:17

## 2018-03-26 RX ADMIN — DOCUSATE SODIUM 100 MG: 100 CAPSULE ORAL at 18:31

## 2018-03-26 RX ADMIN — PROMETHAZINE HYDROCHLORIDE 25 MG: 25 TABLET ORAL at 10:17

## 2018-03-26 RX ADMIN — METOPROLOL TARTRATE 50 MG: 50 TABLET, FILM COATED ORAL at 10:17

## 2018-03-26 RX ADMIN — ACETAMINOPHEN 650 MG: 325 TABLET ORAL at 02:50

## 2018-03-26 RX ADMIN — INSULIN LISPRO 4 UNITS: 100 INJECTION, SOLUTION INTRAVENOUS; SUBCUTANEOUS at 18:32

## 2018-03-26 RX ADMIN — POLYETHYLENE GLYCOL 3350 17 G: 17 POWDER, FOR SOLUTION ORAL at 18:31

## 2018-03-26 ASSESSMENT — PAIN DESCRIPTION - ORIENTATION
ORIENTATION: LEFT

## 2018-03-26 ASSESSMENT — PAIN DESCRIPTION - LOCATION
LOCATION: HIP
LOCATION: HIP
LOCATION: LEG
LOCATION: HIP

## 2018-03-26 ASSESSMENT — PAIN DESCRIPTION - DESCRIPTORS
DESCRIPTORS: SHARP;TENDER
DESCRIPTORS: SHARP
DESCRIPTORS: ACHING
DESCRIPTORS: SHARP

## 2018-03-26 ASSESSMENT — PAIN DESCRIPTION - ONSET
ONSET: ON-GOING
ONSET: SUDDEN
ONSET: ON-GOING

## 2018-03-26 ASSESSMENT — PAIN SCALES - GENERAL
PAINLEVEL_OUTOF10: 9
PAINLEVEL_OUTOF10: 5
PAINLEVEL_OUTOF10: 8
PAINLEVEL_OUTOF10: 10
PAINLEVEL_OUTOF10: 7
PAINLEVEL_OUTOF10: 2

## 2018-03-26 ASSESSMENT — PAIN DESCRIPTION - FREQUENCY
FREQUENCY: INTERMITTENT

## 2018-03-26 ASSESSMENT — PAIN DESCRIPTION - PAIN TYPE
TYPE: ACUTE PAIN

## 2018-03-26 NOTE — RESEARCH
Asked by Dr Abdiel Rodriguez to evaluate pt for protocol [employer] Short DAPT Study # sponsored by [id number].  -Patient met inclusion/exclusion criteria. Ptand   approached @ 10:20  -Patient read study consent. Questions answered. Pt wishes to participate in study. Consent signed voluntarily by pt's  , due to pt's history of stroke , @ 10:55 .  is pt's POA. -A copy of the signed consent was given to the patient.

## 2018-03-26 NOTE — OP NOTE
[facility]        Date:   3/26/2018  Patient name: Fran Whalen  Date of admission:  3/24/2018  4:44 AM  MRN:   [id number]  YOB: 1935    CARDIAC CATHETERIZATION    Operators:  Primary: Renee Lynch (Attending Physician)  Assistant: Maria Farfan (Cardiovascular Fellow)    Indications for cath: NSTEMI    Procedure performed: Left heart catheterization, selective coronary angiography, left ventriculography    Catheters used: JL4, JR4    Access: Right Radial artery     Procedure: After informed consent was obtained with explanation of the risks and benefits, patient was brought to the cath lab. The right groin were prepped and draped in sterile fashion. 1% lidocaine was used for local block. The Radial artery was cannulated with 6  Fr sheath with brisk arterial blood return. The side port was frequently flushed and aspirated with normal saline. Findings:  Left main: Mild irregularities 10-20%  LAD: Patent mid stent area with 30% stenosis, mid 85% stenosis reduced to 0% with JAYA, distal 80% stenosis reduced to 0% with JAYA   LCX: Mild irregularities 20-30%  RCA: 100% occluded mid area with collaterals from the left. The LV gram was performed in the LEA 30 position. LVEF: 50%. LV Wall Motion: Normal      Conclusions:  1. PCI to LAD with JAYA x 2 as above  2. Overall preserved LV function. Recommendation:  1. Post PCI protocol. 2. DAPT. 3. Medical treatments. 4. Risk factor modification. Electronically signed by Giselle Mckeon MD on 3/26/2018 at 1:40 PM  Cardiology Fellow    All reviewed, procedure performed by me.     Sammy Mccain MD  [facility]

## 2018-03-27 VITALS
TEMPERATURE: 97.7 F | WEIGHT: 179.7 LBS | HEIGHT: 65 IN | HEART RATE: 71 BPM | DIASTOLIC BLOOD PRESSURE: 52 MMHG | BODY MASS INDEX: 29.94 KG/M2 | RESPIRATION RATE: 16 BRPM | SYSTOLIC BLOOD PRESSURE: 134 MMHG | OXYGEN SATURATION: 99 %

## 2018-03-27 LAB
GLUCOSE BLD-MCNC: 133 MG/DL (ref 65–105)
GLUCOSE BLD-MCNC: 329 MG/DL (ref 65–105)
PARTIAL THROMBOPLASTIN TIME: 17.2 SEC (ref 20.5–30.5)
PARTIAL THROMBOPLASTIN TIME: 18.3 SEC (ref 20.5–30.5)
PARTIAL THROMBOPLASTIN TIME: 23.6 SEC (ref 20.5–30.5)
TROPONIN INTERP: ABNORMAL
TROPONIN T: 1.26 NG/ML
TROPONIN T: 1.37 NG/ML
TROPONIN T: 1.45 NG/ML

## 2018-03-27 PROCEDURE — 84484 ASSAY OF TROPONIN QUANT: CPT

## 2018-03-27 PROCEDURE — 85730 THROMBOPLASTIN TIME PARTIAL: CPT

## 2018-03-27 PROCEDURE — 36415 COLL VENOUS BLD VENIPUNCTURE: CPT

## 2018-03-27 PROCEDURE — 2580000003 HC RX 258: Performed by: INTERNAL MEDICINE

## 2018-03-27 PROCEDURE — 6370000000 HC RX 637 (ALT 250 FOR IP): Performed by: INTERNAL MEDICINE

## 2018-03-27 PROCEDURE — 82947 ASSAY GLUCOSE BLOOD QUANT: CPT

## 2018-03-27 PROCEDURE — 94762 N-INVAS EAR/PLS OXIMTRY CONT: CPT

## 2018-03-27 RX ORDER — CLOPIDOGREL BISULFATE 75 MG/1
75 TABLET ORAL DAILY
Qty: 30 TABLET | Refills: 11 | Status: ON HOLD | OUTPATIENT
Start: 2018-03-28 | End: 2020-03-23 | Stop reason: HOSPADM

## 2018-03-27 RX ORDER — ATORVASTATIN CALCIUM 40 MG/1
40 TABLET, FILM COATED ORAL NIGHTLY
Qty: 30 TABLET | Refills: 11 | Status: ON HOLD | OUTPATIENT
Start: 2018-03-27 | End: 2020-03-22

## 2018-03-27 RX ADMIN — METOPROLOL TARTRATE 50 MG: 50 TABLET, FILM COATED ORAL at 08:34

## 2018-03-27 RX ADMIN — Medication 10 ML: at 08:41

## 2018-03-27 RX ADMIN — Medication 10 ML: at 08:35

## 2018-03-27 RX ADMIN — DOCUSATE SODIUM 100 MG: 100 CAPSULE ORAL at 08:34

## 2018-03-27 RX ADMIN — POLYETHYLENE GLYCOL 3350 17 G: 17 POWDER, FOR SOLUTION ORAL at 08:34

## 2018-03-27 RX ADMIN — ASPIRIN 81 MG: 81 TABLET, CHEWABLE ORAL at 08:34

## 2018-03-27 RX ADMIN — TROLAMINE SALICYLATE: 8.5 CREAM TOPICAL at 08:34

## 2018-03-27 RX ADMIN — PANTOPRAZOLE SODIUM 40 MG: 40 TABLET, DELAYED RELEASE ORAL at 08:34

## 2018-03-27 RX ADMIN — CLOPIDOGREL 75 MG: 75 TABLET, FILM COATED ORAL at 08:34

## 2018-03-27 ASSESSMENT — PAIN SCALES - GENERAL: PAINLEVEL_OUTOF10: 2

## 2018-03-27 ASSESSMENT — PAIN DESCRIPTION - PAIN TYPE: TYPE: SUPERFICIAL SOMATIC PAIN

## 2018-03-27 NOTE — PLAN OF CARE
Problem: Falls - Risk of  Goal: Absence of falls  Outcome: Ongoing  Pt remains free of falls  Bed in lowest position, call light within reach, bed alarm ON    Problem: Pain:  Goal: Pain level will decrease  Pain level will decrease    Outcome: Ongoing    Goal: Control of acute pain  Control of acute pain    Outcome: Ongoing    Goal: Control of chronic pain  Control of chronic pain    Outcome: Ongoing      Problem: Nutrition  Goal: Optimal nutrition therapy  Outcome: Ongoing

## 2018-03-27 NOTE — DISCHARGE SUMMARY
Normal    Medications changes recommendation: Plavix, Atorvastatin  Follow Up Plan: 2 weeks with [facility] . Repeat echo in 90days. Discharge exam:   Vitals:    03/27/18 0700   BP: (!) 134/52   Pulse:    Resp:    Temp: 97.7 °F (36.5 °C)   SpO2:      Neuro: normal  Chest: Clear to ausculation. No wheezing. Cardiac: Cor RRR  Abdomen/groin: soft, non-tender, without masses or organomegaly  Lower extremity edema: none     Follow up with primary care provider 1 week  Follow up with cardiology 4 weeks  Follow up with other consultant physicians at their directions.     Discharge Medications:   Varsha Simpson   Home Medication Instructions UJO:[id number]    Printed on:03/27/18 1123   Medication Information                      aspirin 81 MG tablet  Take 81 mg by mouth daily             atorvastatin (LIPITOR) 40 MG tablet  Take 1 tablet by mouth nightly             clopidogrel (PLAVIX) 75 MG tablet  Take 1 tablet by mouth daily             esomeprazole Magnesium (NEXIUM) 40 MG PACK  Take 40 mg by mouth daily             glipiZIDE (GLUCOTROL) 5 MG tablet  Take 5 mg by mouth daily             metFORMIN (GLUCOPHAGE) 1000 MG tablet  Take 1,000 mg by mouth daily (with breakfast)             metoprolol (TOPROL-XL) 50 MG XL tablet  Take 50 mg by mouth daily                Current Facility-Administered Medications: 0.9 % sodium chloride infusion, , Intravenous, Continuous  sodium chloride flush 0.9 % injection 10 mL, 10 mL, Intravenous, 2 times per day  sodium chloride flush 0.9 % injection 10 mL, 10 mL, Intravenous, PRN  acetaminophen (TYLENOL) tablet 650 mg, 650 mg, Oral, Q4H PRN  magnesium hydroxide (MILK OF MAGNESIA) 400 MG/5ML suspension 30 mL, 30 mL, Oral, Daily PRN  ondansetron (ZOFRAN) injection 4 mg, 4 mg, Intravenous, Q6H PRN  clopidogrel (PLAVIX) tablet 75 mg, 75 mg, Oral, Daily  atorvastatin (LIPITOR) tablet 40 mg, 40 mg, Oral, Nightly  docusate sodium (COLACE) capsule 100 mg, 100 mg, Oral, BID  polyethylene glycol (GLYCOLAX) packet 17 g, 17 g, Oral, Daily  ondansetron (ZOFRAN) injection 4 mg, 4 mg, Intravenous, Q6H PRN  promethazine (PHENERGAN) tablet 25 mg, 25 mg, Oral, Q6H PRN  pneumococcal 13-valent conjugate (PREVNAR) injection 0.5 mL, 0.5 mL, Intramuscular, Once  albuterol sulfate  (90 Base) MCG/ACT inhaler 2 puff, 2 puff, Inhalation, Q4H PRN  insulin lispro (HUMALOG) injection vial 0-12 Units, 0-12 Units, Subcutaneous, TID WC  insulin lispro (HUMALOG) injection vial 0-6 Units, 0-6 Units, Subcutaneous, Nightly  glucose (GLUTOSE) 40 % oral gel 15 g, 15 g, Oral, PRN  dextrose 50 % solution 12.5 g, 12.5 g, Intravenous, PRN  glucagon (rDNA) injection 1 mg, 1 mg, Intramuscular, PRN  dextrose 5 % solution, 100 mL/hr, Intravenous, PRN  aspirin chewable tablet 81 mg, 81 mg, Oral, Daily  metoprolol tartrate (LOPRESSOR) tablet 50 mg, 50 mg, Oral, Daily  pantoprazole (PROTONIX) tablet 40 mg, 40 mg, Oral, QAM AC  acetaminophen (TYLENOL) tablet 650 mg, 650 mg, Oral, Q4H PRN  trolamine salicylate (ASPERCREME) 10 % cream, , Topical, BID PRN  morphine injection 2 mg, 2 mg, Intravenous, Q4H PRN **OR** morphine (PF) injection 4 mg, 4 mg, Intravenous, Q4H PRN    Coronary Discharge Core Measure: Please indicate the medication given by X, and if not the reasons not given:    Not Given Reason  Given      Beta Blockers X   DOMENICO/CKD   ACE-I       Statins X      ASA X      OAP (Plavix/Effient/Brilinta) X           Discussed with patient and nursing. Medications and discharge instructions reviewed with patient and nursing.     Electronically signed by Maria Montemayor DO on 3/27/2018 at 11:23 [facility]      [phone]

## 2018-03-27 NOTE — PROGRESS NOTES
Discharge   Discharge instruction given to pt , pt verbalized understanding of discharge given, pt discharged home in stable condition, care plan met
Nutrition Assessment    Type and Reason for Visit: Initial, Positive Nutrition Screen (Poor Appetite, Diet Education)    Nutrition Recommendations:   - Continue current Cardiac diet as tolerated. Encourage intakes. - Provide Glucerna ONS x 2 per day. Malnutrition Assessment:  · Malnutrition Status: At risk for malnutrition  · Context: Acute illness or injury  · Findings of the 6 clinical characteristics of malnutrition (Minimum of 2 out of 6 clinical characteristics is required to make the diagnosis of moderate or severe Protein Calorie Malnutrition based on AND/ASPEN Guidelines):  1. Energy Intake-Less than or equal to 50%, greater than 7 days    2. Weight Loss-No significant weight loss  3. Fat Loss-No significant subcutaneous fat loss  4. Muscle Loss-No significant muscle mass loss  5. Fluid Accumulation-Mild fluid accumulation, Extremities, Generalized    Nutrition Diagnosis:   · Problem: Inadequate oral intake  · Etiology: related to  Appetite/Current Condition     Signs and symptoms:  as evidenced by Diet history of poor intake, Intake 25-50%    Nutrition Assessment:  · Subjective Assessment: Admitted with c/o chest pain. Pt reports having a decreased appetite recently and eating less than usual.  Pt has been taking less than 50% of meals provided. C/o some nausea today. Noted sodium has been low. · Nutrition-Focused Physical Findings: +1 BLE and pitting generalized edema.   · Wound Type: None  · Current Nutrition Therapies:  · Oral Diet Orders: Cardiac   · Oral Diet intake: 26-50%  · Oral Nutrition Supplement (ONS) Orders: None  · Anthropometric Measures:  · Ht: 5' 5\" (165.1 cm)   · Current Body Wt: 180 lb 3.2 oz (81.7 kg)  · Admission Body Wt: 180 lb 3.2 oz (81.7 kg)  · Ideal Body Wt: 125 lb (56.7 kg), % Ideal Body 144%  · Adjusted Body Wt: 143 lb (64.9 kg), body weight adjusted for Obesity  · BMI Classification: BMI 30.0 - 34.9 Obese Class I  · Comparative Standards (Estimated Nutrition
PATIENT REFUSES TO WEAR BIPAP     [x] Risks and benefits explained to patient   [x] Patient refuses to wear Bipap   [x] Patient verbalizes understanding of information presented.
Page sent to Dr. Myra Zuniga via [employer] regarding pt's arrival with only telemetry orders present at 4:59, no response received  Second page sent via [employer] at 5:18  Call back received at 5:41  Telephone orders received
The Respiratory Therapy Department completed the Respiratory Care evaluation. No therapy is indicated at this time. The reason therapy is not indicated is due to the following:     [] Patient does not meet indications for therapy. [x] Patient has returned to their home regimen. [x] Patient frequency has changed to prn, nursing to assess and call if needed       No PRN treatments needed over 48 hours    . Respiratory Care will not see this patient further unless otherwise requested. Please call the respiratory care charge therapist with questions or concerns at extension [phone].  Thank you for using the Respiratory Therapy Protocol Program.    Kelvin Meek   7:54 AM
SOB []  SOB on exertion []  SOB min activity []  At rest/acute   e FEV% Predicted       [x]  NA []  Above 69%  []  Unable []  Above 60-69%  []  Unable []  Above 50-59%  []  Unable []  Above 35-49%  []  Unable []  Less than 35%  []  Unable                 []  Hyperinflation Assessment  Score 1 2 3   CXR and Breath Sounds   []  Clear []  No atelectasis  Basilar aeration []  Atelectasis or absent basilar breath sounds   Incentive Spirometry Volume  (Per IBW)   []  Greater than or equal to 15ml/Kg []  less than 15ml/Kg []  less than 15ml/Kg   Surgery within last 2 weeks []  None or general   []  Abdominal or thoracic surgery  []  Abdominal or thoracic   Chronic Pulmonary Historyre []  No []  Yes []  Yes     []  Secretion Management Assessment  Score 1 2 3   Bilateral Breath Sounds   []  Occasional Rhonchi []  Scattered Rhonchi []  Course Rhonchi and/or poor aeration   Sputum    []  Small amount of thin secretions []  Moderate amount of viscous secretions []  Copius, Viscious Yellow/ Secretions   CXR as reported by physician []  clear  []  Unavailable []  Infiltrates and/or consolidation  []  Unavailable []  Mucus Plugging and or lobar consolidation  []  Unavailable   Cough []  Strong, productive cough []  Weak productive cough []  No cough or weak non-productive cough   Viri Mack  5:47 AM                            FEMALE                                  MALE                            FEV1 Predicted Normal Values                        FEV1 Predicted Normal Values          Age                                     Height in Feet and Inches       Age                                     Height in Feet and Inches       4' 11\" 5' 1\" 5' 3\" 5' 5\" 5' 7\" 5' 9\" 5' 11\" 6' 1\"  4' 11\" 5' 1\" 5' 3\" 5' 5\" 5' 7\" 5' 9\" 5' 11\" 6' 1\"   42 - 45 2.49 2.66 2.84 3.03 3.22 3.42 3.62 3.83 42 - 45 2.82 3.03 3.26 3.49 3.72 3.96 4.22 4.47   46 - 49 2.40 2.57 2.76 2.94 3.14 3.33 3.54 3.75 46 - 49 2.70 2.92 3.14 3.37 3.61 3.85 4.10 4.36   50 -

## 2020-03-17 ENCOUNTER — APPOINTMENT (OUTPATIENT)
Dept: CT IMAGING | Age: 85
End: 2020-03-17
Payer: MEDICARE

## 2020-03-17 ENCOUNTER — HOSPITAL ENCOUNTER (EMERGENCY)
Age: 85
LOS: 1 days | Discharge: HOME | End: 2020-03-17
Attending: EMERGENCY MEDICINE | Admitting: FAMILY MEDICINE
Payer: MEDICARE

## 2020-03-17 VITALS
HEART RATE: 76 BPM | BODY MASS INDEX: 28.99 KG/M2 | OXYGEN SATURATION: 97 % | DIASTOLIC BLOOD PRESSURE: 110 MMHG | TEMPERATURE: 97.9 F | HEIGHT: 65 IN | SYSTOLIC BLOOD PRESSURE: 194 MMHG | RESPIRATION RATE: 22 BRPM | WEIGHT: 174 LBS

## 2020-03-17 LAB
-: ABNORMAL
AMORPHOUS: ABNORMAL
ANION GAP SERPL CALCULATED.3IONS-SCNC: 13 MMOL/L (ref 9–17)
BACTERIA: ABNORMAL
BILIRUBIN URINE: NEGATIVE
BUN BLDV-MCNC: 17 MG/DL (ref 8–23)
BUN/CREAT BLD: 12 (ref 9–20)
CALCIUM SERPL-MCNC: 8.9 MG/DL (ref 8.6–10.4)
CASTS UA: ABNORMAL /LPF
CHLORIDE BLD-SCNC: 94 MMOL/L (ref 98–107)
CO2: 21 MMOL/L (ref 20–31)
COLOR: YELLOW
COMMENT UA: ABNORMAL
CREAT SERPL-MCNC: 1.4 MG/DL (ref 0.5–0.9)
CRYSTALS, UA: ABNORMAL /HPF
EPITHELIAL CELLS UA: ABNORMAL /HPF (ref 0–25)
GFR AFRICAN AMERICAN: 44 ML/MIN
GFR NON-AFRICAN AMERICAN: 36 ML/MIN
GFR SERPL CREATININE-BSD FRML MDRD: ABNORMAL ML/MIN/{1.73_M2}
GFR SERPL CREATININE-BSD FRML MDRD: ABNORMAL ML/MIN/{1.73_M2}
GLUCOSE BLD-MCNC: 206 MG/DL (ref 70–99)
GLUCOSE URINE: NEGATIVE
HCT VFR BLD CALC: 41.2 % (ref 36.3–47.1)
HEMOGLOBIN: 13.6 G/DL (ref 11.9–15.1)
KETONES, URINE: NEGATIVE
LEUKOCYTE ESTERASE, URINE: NEGATIVE
MCH RBC QN AUTO: 30 PG (ref 25.2–33.5)
MCHC RBC AUTO-ENTMCNC: 33 G/DL (ref 28.4–34.8)
MCV RBC AUTO: 90.9 FL (ref 82.6–102.9)
MUCUS: ABNORMAL
NITRITE, URINE: NEGATIVE
NRBC AUTOMATED: 0 PER 100 WBC
OTHER OBSERVATIONS UA: ABNORMAL
PDW BLD-RTO: 12.3 % (ref 11.8–14.4)
PH UA: 7 (ref 5–9)
PLATELET # BLD: 332 K/UL (ref 138–453)
PMV BLD AUTO: 8.9 FL (ref 8.1–13.5)
POTASSIUM SERPL-SCNC: 4.9 MMOL/L (ref 3.7–5.3)
PROTEIN UA: ABNORMAL
RBC # BLD: 4.53 M/UL (ref 3.95–5.11)
RBC UA: ABNORMAL /HPF (ref 0–2)
RENAL EPITHELIAL, UA: ABNORMAL /HPF
SODIUM BLD-SCNC: 128 MMOL/L (ref 135–144)
SPECIFIC GRAVITY UA: 1.01 (ref 1.01–1.02)
TRICHOMONAS: ABNORMAL
TURBIDITY: CLEAR
URINE HGB: NEGATIVE
UROBILINOGEN, URINE: NORMAL
WBC # BLD: 6.9 K/UL (ref 3.5–11.3)
WBC UA: ABNORMAL /HPF (ref 0–5)
YEAST: ABNORMAL

## 2020-03-17 PROCEDURE — 87086 URINE CULTURE/COLONY COUNT: CPT

## 2020-03-17 PROCEDURE — 6360000004 HC RX CONTRAST MEDICATION: Performed by: EMERGENCY MEDICINE

## 2020-03-17 PROCEDURE — 6370000000 HC RX 637 (ALT 250 FOR IP): Performed by: EMERGENCY MEDICINE

## 2020-03-17 PROCEDURE — 81001 URINALYSIS AUTO W/SCOPE: CPT

## 2020-03-17 PROCEDURE — 6360000002 HC RX W HCPCS: Performed by: EMERGENCY MEDICINE

## 2020-03-17 PROCEDURE — 3209999900 CT LUMBAR SPINE TRAUMA RECONSTRUCTION

## 2020-03-17 PROCEDURE — 99284 EMERGENCY DEPT VISIT MOD MDM: CPT

## 2020-03-17 PROCEDURE — 96375 TX/PRO/DX INJ NEW DRUG ADDON: CPT

## 2020-03-17 PROCEDURE — 96374 THER/PROPH/DIAG INJ IV PUSH: CPT

## 2020-03-17 PROCEDURE — 36415 COLL VENOUS BLD VENIPUNCTURE: CPT

## 2020-03-17 PROCEDURE — 80048 BASIC METABOLIC PNL TOTAL CA: CPT

## 2020-03-17 PROCEDURE — 6370000000 HC RX 637 (ALT 250 FOR IP)

## 2020-03-17 PROCEDURE — 85027 COMPLETE CBC AUTOMATED: CPT

## 2020-03-17 PROCEDURE — 74174 CTA ABD&PLVS W/CONTRAST: CPT

## 2020-03-17 RX ORDER — FENTANYL CITRATE 50 UG/ML
25 INJECTION, SOLUTION INTRAMUSCULAR; INTRAVENOUS ONCE
Status: COMPLETED | OUTPATIENT
Start: 2020-03-17 | End: 2020-03-17

## 2020-03-17 RX ORDER — LIDOCAINE 4 G/G
1 PATCH TOPICAL DAILY
Status: DISCONTINUED | OUTPATIENT
Start: 2020-03-17 | End: 2020-03-17 | Stop reason: HOSPADM

## 2020-03-17 RX ORDER — OXYCODONE HYDROCHLORIDE AND ACETAMINOPHEN 5; 325 MG/1; MG/1
1 TABLET ORAL ONCE
Status: COMPLETED | OUTPATIENT
Start: 2020-03-17 | End: 2020-03-17

## 2020-03-17 RX ORDER — OXYCODONE HYDROCHLORIDE AND ACETAMINOPHEN 5; 325 MG/1; MG/1
1 TABLET ORAL EVERY 6 HOURS PRN
Qty: 4 TABLET | Refills: 0 | Status: SHIPPED | OUTPATIENT
Start: 2020-03-17 | End: 2020-03-20

## 2020-03-17 RX ORDER — ONDANSETRON 2 MG/ML
4 INJECTION INTRAMUSCULAR; INTRAVENOUS ONCE
Status: COMPLETED | OUTPATIENT
Start: 2020-03-17 | End: 2020-03-17

## 2020-03-17 RX ORDER — LIDOCAINE 4 G/G
PATCH TOPICAL
Status: COMPLETED
Start: 2020-03-17 | End: 2020-03-17

## 2020-03-17 RX ADMIN — IOPAMIDOL 50 ML: 755 INJECTION, SOLUTION INTRAVENOUS at 06:00

## 2020-03-17 RX ADMIN — OXYCODONE HYDROCHLORIDE AND ACETAMINOPHEN 1 TABLET: 5; 325 TABLET ORAL at 06:55

## 2020-03-17 RX ADMIN — FENTANYL CITRATE 25 MCG: 50 INJECTION INTRAMUSCULAR; INTRAVENOUS at 05:28

## 2020-03-17 RX ADMIN — ONDANSETRON 4 MG: 2 INJECTION INTRAMUSCULAR; INTRAVENOUS at 08:00

## 2020-03-17 ASSESSMENT — PAIN SCALES - GENERAL
PAINLEVEL_OUTOF10: 9
PAINLEVEL_OUTOF10: 7
PAINLEVEL_OUTOF10: 9
PAINLEVEL_OUTOF10: 9

## 2020-03-17 ASSESSMENT — ENCOUNTER SYMPTOMS
DIARRHEA: 0
BACK PAIN: 1
RHINORRHEA: 0
COUGH: 0
SHORTNESS OF BREATH: 0
ABDOMINAL DISTENTION: 0
NAUSEA: 0
CONSTIPATION: 0
VOMITING: 0
WHEEZING: 0
SORE THROAT: 0

## 2020-03-17 ASSESSMENT — PAIN DESCRIPTION - PAIN TYPE
TYPE: ACUTE PAIN
TYPE: ACUTE PAIN

## 2020-03-17 ASSESSMENT — PAIN DESCRIPTION - FREQUENCY: FREQUENCY: CONTINUOUS

## 2020-03-17 ASSESSMENT — PAIN DESCRIPTION - LOCATION
LOCATION: BACK
LOCATION: BACK

## 2020-03-17 ASSESSMENT — PAIN DESCRIPTION - ORIENTATION: ORIENTATION: DISTAL

## 2020-03-17 ASSESSMENT — PAIN DESCRIPTION - DESCRIPTORS: DESCRIPTORS: ACHING

## 2020-03-17 NOTE — ED PROVIDER NOTES
Sign out receiving  ADDENDUM  This patient was signed out to me at shift change  The pending studies are: u./a  I have reviewed the ED record and personally evaluated this patient  BP (!) 194/110   Pulse 76   Temp 97.9 °F (36.6 °C) (Oral)   Resp 22   Ht 5' 5\" (1.651 m)   Wt 174 lb (78.9 kg)   SpO2 97%   BMI 28.96 kg/m²   ED course: ua without infection. Will discharge    FINAL IMPRESSION   1.  Compression fracture of L4 vertebra, initial encounter ([facility] [address]                   Damaris Ayers MD  03/17/20 6660

## 2020-03-17 NOTE — ED PROVIDER NOTES
by mouth daily    Historical Provider, MD   metoprolol (TOPROL-XL) 50 MG XL tablet Take 50 mg by mouth daily    Historical Provider, MD   glipiZIDE (GLUCOTROL) 5 MG tablet Take 5 mg by mouth daily    Historical Provider, MD   aspirin 81 MG tablet Take 81 mg by mouth daily    Historical Provider, MD   metFORMIN (GLUCOPHAGE) 1000 MG tablet Take 1,000 mg by mouth daily (with breakfast)    Historical Provider, MD       REVIEW OF SYSTEMS    (2-9 systems for level 4, 10 or more for level 5)      Review of Systems   Constitutional: Negative for activity change, appetite change, fatigue and fever. HENT: Negative for congestion, rhinorrhea and sore throat. Respiratory: Negative for cough, shortness of breath and wheezing. Cardiovascular: Negative for chest pain, palpitations and leg swelling. Gastrointestinal: Negative for abdominal distention, constipation, diarrhea, nausea and vomiting. Genitourinary: Negative for decreased urine volume, difficulty urinating and dysuria. Musculoskeletal: Positive for back pain. Skin: Negative for rash. Neurological: Negative for dizziness, weakness, light-headedness, numbness and headaches. PHYSICAL EXAM   (up to 7 for level 4, 8 or more for level 5)     INITIAL VITALS:   BP (!) 194/110   Pulse 76   Temp 97.9 °F (36.6 °C) (Oral)   Resp 22   Ht 5' 5\" (1.651 m)   Wt 174 lb (78.9 kg)   SpO2 97%   BMI 28.96 kg/m²     Physical Exam  Vitals signs and nursing note reviewed. Constitutional:       Comments: Nontoxic appearing, answering all questions appropriately no signs of distress   HENT:      Head: Normocephalic and atraumatic. Eyes:      General:         Right eye: No discharge. Left eye: No discharge. Conjunctiva/sclera: Conjunctivae normal.   Cardiovascular:      Rate and Rhythm: Normal rate and regular rhythm. Heart sounds: Normal heart sounds. No murmur. No friction rub. No gallop.     Pulmonary:      Effort: Pulmonary effort is normal. done, patient without any neuro deficits at this time, she can follow up outpatient. UA was sent, short course of pain control. FINAL IMPRESSION      1. Compression fracture of L4 vertebra, initial encounter [facility])          DISPOSITION / PLAN     DISPOSITION Decision To Discharge 03/17/2020 06:39:36 AM      PATIENT REFERRED TO:  Rodríguez Soto [address] [place] [id number] [phone]    Schedule an appointment as soon as possible for a visit in 2 days        DISCHARGE MEDICATIONS:  New Prescriptions    OXYCODONE-ACETAMINOPHEN (PERCOCET) 5-325 MG PER TABLET    Take 1 tablet by mouth every 6 hours as needed for Pain for up to 3 days. WARNING:  May cause drowsiness. May impair ability to operate vehicles or machinery. Do not use in combination with alcohol.        Antonella Garcia  6:49 AM    Attending Emergency Physician  [facility]    (Please note that portions of this note were completed with a voice recognition program.  Effortswere made to edit the dictations but occasionally words are mis-transcribed.)              Mya Clemente DO  03/17/20 7946

## 2020-03-17 NOTE — ED NOTES
Pt assisted to wheel chair with 2 person assist. Pt wrapped in blanket for warmth as she does not have a coat. Pt placed in waiting room with mask on to wait for [facility] bus.        Buck Alvarenga RN  03/17/20 6233

## 2020-03-17 NOTE — ED NOTES
Contacted [facility] for transport back home. ETA approx [phone].      Damari ALLAN Reser  03/17/20 1791

## 2020-03-18 LAB
CULTURE: NORMAL
Lab: NORMAL
SPECIMEN DESCRIPTION: NORMAL

## 2020-03-21 ENCOUNTER — HOSPITAL ENCOUNTER (INPATIENT)
Age: 85
LOS: 2 days | Discharge: NURSING, REHAB OR LONG-TERM CARE | DRG: 641 | End: 2020-03-23
Attending: EMERGENCY MEDICINE | Admitting: INTERNAL MEDICINE
Payer: MEDICARE

## 2020-03-21 PROBLEM — E86.0 DEHYDRATION WITH HYPONATREMIA: Status: ACTIVE | Noted: 2020-03-21

## 2020-03-21 PROBLEM — E87.1 DEHYDRATION WITH HYPONATREMIA: Status: ACTIVE | Noted: 2020-03-21

## 2020-03-21 PROBLEM — E86.0 DEHYDRATION: Status: ACTIVE | Noted: 2020-03-21

## 2020-03-21 LAB
-: ABNORMAL
ABSOLUTE EOS #: 0.12 K/UL (ref 0–0.44)
ABSOLUTE IMMATURE GRANULOCYTE: <0.03 K/UL (ref 0–0.3)
ABSOLUTE LYMPH #: 0.87 K/UL (ref 1.1–3.7)
ABSOLUTE MONO #: 0.93 K/UL (ref 0.1–1.2)
AMORPHOUS: ABNORMAL
ANION GAP SERPL CALCULATED.3IONS-SCNC: 13 MMOL/L (ref 9–17)
BACTERIA: ABNORMAL
BASOPHILS # BLD: 0 % (ref 0–2)
BASOPHILS ABSOLUTE: 0.03 K/UL (ref 0–0.2)
BILIRUBIN URINE: NEGATIVE
BUN BLDV-MCNC: 30 MG/DL (ref 8–23)
BUN/CREAT BLD: 16 (ref 9–20)
CALCIUM SERPL-MCNC: 10.1 MG/DL (ref 8.6–10.4)
CASTS UA: ABNORMAL /LPF
CHLORIDE BLD-SCNC: 92 MMOL/L (ref 98–107)
CO2: 22 MMOL/L (ref 20–31)
COLOR: YELLOW
COMMENT UA: ABNORMAL
CREAT SERPL-MCNC: 1.93 MG/DL (ref 0.5–0.9)
CRYSTALS, UA: ABNORMAL /HPF
DIFFERENTIAL TYPE: ABNORMAL
EOSINOPHILS RELATIVE PERCENT: 1 % (ref 1–4)
EPITHELIAL CELLS UA: ABNORMAL /HPF (ref 0–25)
ESTIMATED AVERAGE GLUCOSE: 146 MG/DL
GFR AFRICAN AMERICAN: 30 ML/MIN
GFR NON-AFRICAN AMERICAN: 25 ML/MIN
GFR SERPL CREATININE-BSD FRML MDRD: ABNORMAL ML/MIN/{1.73_M2}
GFR SERPL CREATININE-BSD FRML MDRD: ABNORMAL ML/MIN/{1.73_M2}
GLUCOSE BLD-MCNC: 135 MG/DL (ref 74–100)
GLUCOSE BLD-MCNC: 147 MG/DL (ref 74–100)
GLUCOSE BLD-MCNC: 234 MG/DL (ref 70–99)
GLUCOSE URINE: NEGATIVE
HBA1C MFR BLD: 6.7 % (ref 4.8–5.9)
HCT VFR BLD CALC: 40.5 % (ref 36.3–47.1)
HEMOGLOBIN: 13.4 G/DL (ref 11.9–15.1)
IMMATURE GRANULOCYTES: 0 %
KETONES, URINE: NEGATIVE
LEUKOCYTE ESTERASE, URINE: ABNORMAL
LYMPHOCYTES # BLD: 8 % (ref 24–43)
MCH RBC QN AUTO: 30.6 PG (ref 25.2–33.5)
MCHC RBC AUTO-ENTMCNC: 33.1 G/DL (ref 28.4–34.8)
MCV RBC AUTO: 92.5 FL (ref 82.6–102.9)
MONOCYTES # BLD: 9 % (ref 3–12)
MUCUS: ABNORMAL
NITRITE, URINE: NEGATIVE
NRBC AUTOMATED: 0 PER 100 WBC
OTHER OBSERVATIONS UA: ABNORMAL
PDW BLD-RTO: 12.5 % (ref 11.8–14.4)
PH UA: 5.5 (ref 5–9)
PLATELET # BLD: 258 K/UL (ref 138–453)
PLATELET ESTIMATE: ABNORMAL
PMV BLD AUTO: 9.4 FL (ref 8.1–13.5)
POTASSIUM SERPL-SCNC: 5.1 MMOL/L (ref 3.7–5.3)
PROTEIN UA: ABNORMAL
RBC # BLD: 4.38 M/UL (ref 3.95–5.11)
RBC # BLD: ABNORMAL 10*6/UL
RBC UA: ABNORMAL /HPF (ref 0–2)
RENAL EPITHELIAL, UA: ABNORMAL /HPF
SEG NEUTROPHILS: 81 % (ref 36–65)
SEGMENTED NEUTROPHILS ABSOLUTE COUNT: 8.59 K/UL (ref 1.5–8.1)
SODIUM BLD-SCNC: 127 MMOL/L (ref 135–144)
SPECIFIC GRAVITY UA: 1.02 (ref 1.01–1.02)
TRICHOMONAS: ABNORMAL
TURBIDITY: CLEAR
URINE HGB: NEGATIVE
UROBILINOGEN, URINE: NORMAL
WBC # BLD: 10.6 K/UL (ref 3.5–11.3)
WBC # BLD: ABNORMAL 10*3/UL
WBC UA: ABNORMAL /HPF (ref 0–5)
YEAST: ABNORMAL

## 2020-03-21 PROCEDURE — 2580000003 HC RX 258: Performed by: INTERNAL MEDICINE

## 2020-03-21 PROCEDURE — 87040 BLOOD CULTURE FOR BACTERIA: CPT

## 2020-03-21 PROCEDURE — 96374 THER/PROPH/DIAG INJ IV PUSH: CPT

## 2020-03-21 PROCEDURE — 6370000000 HC RX 637 (ALT 250 FOR IP): Performed by: INTERNAL MEDICINE

## 2020-03-21 PROCEDURE — 81001 URINALYSIS AUTO W/SCOPE: CPT

## 2020-03-21 PROCEDURE — 99284 EMERGENCY DEPT VISIT MOD MDM: CPT

## 2020-03-21 PROCEDURE — 1200000000 HC SEMI PRIVATE

## 2020-03-21 PROCEDURE — 6360000002 HC RX W HCPCS: Performed by: INTERNAL MEDICINE

## 2020-03-21 PROCEDURE — 87086 URINE CULTURE/COLONY COUNT: CPT

## 2020-03-21 PROCEDURE — 2580000003 HC RX 258: Performed by: EMERGENCY MEDICINE

## 2020-03-21 PROCEDURE — 83036 HEMOGLOBIN GLYCOSYLATED A1C: CPT

## 2020-03-21 PROCEDURE — 82947 ASSAY GLUCOSE BLOOD QUANT: CPT

## 2020-03-21 PROCEDURE — 96361 HYDRATE IV INFUSION ADD-ON: CPT

## 2020-03-21 PROCEDURE — 36415 COLL VENOUS BLD VENIPUNCTURE: CPT

## 2020-03-21 PROCEDURE — 85025 COMPLETE CBC W/AUTO DIFF WBC: CPT

## 2020-03-21 PROCEDURE — 80048 BASIC METABOLIC PNL TOTAL CA: CPT

## 2020-03-21 PROCEDURE — 6360000002 HC RX W HCPCS: Performed by: EMERGENCY MEDICINE

## 2020-03-21 RX ORDER — FENTANYL CITRATE 50 UG/ML
50 INJECTION, SOLUTION INTRAMUSCULAR; INTRAVENOUS ONCE
Status: COMPLETED | OUTPATIENT
Start: 2020-03-21 | End: 2020-03-21

## 2020-03-21 RX ORDER — SODIUM CHLORIDE 0.9 % (FLUSH) 0.9 %
10 SYRINGE (ML) INJECTION EVERY 12 HOURS SCHEDULED
Status: DISCONTINUED | OUTPATIENT
Start: 2020-03-21 | End: 2020-03-23 | Stop reason: HOSPADM

## 2020-03-21 RX ORDER — DEXTROSE MONOHYDRATE 25 G/50ML
12.5 INJECTION, SOLUTION INTRAVENOUS PRN
Status: DISCONTINUED | OUTPATIENT
Start: 2020-03-21 | End: 2020-03-23 | Stop reason: HOSPADM

## 2020-03-21 RX ORDER — NICOTINE POLACRILEX 4 MG
15 LOZENGE BUCCAL PRN
Status: DISCONTINUED | OUTPATIENT
Start: 2020-03-21 | End: 2020-03-23 | Stop reason: HOSPADM

## 2020-03-21 RX ORDER — ATORVASTATIN CALCIUM 40 MG/1
40 TABLET, FILM COATED ORAL NIGHTLY
Status: DISCONTINUED | OUTPATIENT
Start: 2020-03-21 | End: 2020-03-21

## 2020-03-21 RX ORDER — POLYETHYLENE GLYCOL 3350 17 G/17G
17 POWDER, FOR SOLUTION ORAL DAILY PRN
Status: DISCONTINUED | OUTPATIENT
Start: 2020-03-21 | End: 2020-03-23 | Stop reason: HOSPADM

## 2020-03-21 RX ORDER — DEXTROSE MONOHYDRATE 50 MG/ML
100 INJECTION, SOLUTION INTRAVENOUS PRN
Status: DISCONTINUED | OUTPATIENT
Start: 2020-03-21 | End: 2020-03-23 | Stop reason: HOSPADM

## 2020-03-21 RX ORDER — ONDANSETRON 2 MG/ML
4 INJECTION INTRAMUSCULAR; INTRAVENOUS EVERY 6 HOURS PRN
Status: DISCONTINUED | OUTPATIENT
Start: 2020-03-21 | End: 2020-03-23 | Stop reason: HOSPADM

## 2020-03-21 RX ORDER — HYDROCODONE BITARTRATE AND ACETAMINOPHEN 5; 325 MG/1; MG/1
1 TABLET ORAL EVERY 6 HOURS PRN
Status: DISCONTINUED | OUTPATIENT
Start: 2020-03-21 | End: 2020-03-23 | Stop reason: HOSPADM

## 2020-03-21 RX ORDER — ACETAMINOPHEN 650 MG/1
650 SUPPOSITORY RECTAL EVERY 6 HOURS PRN
Status: DISCONTINUED | OUTPATIENT
Start: 2020-03-21 | End: 2020-03-23 | Stop reason: HOSPADM

## 2020-03-21 RX ORDER — METOPROLOL SUCCINATE 50 MG/1
50 TABLET, EXTENDED RELEASE ORAL DAILY
Status: DISCONTINUED | OUTPATIENT
Start: 2020-03-21 | End: 2020-03-21

## 2020-03-21 RX ORDER — ACETAMINOPHEN 325 MG/1
650 TABLET ORAL EVERY 6 HOURS PRN
Status: DISCONTINUED | OUTPATIENT
Start: 2020-03-21 | End: 2020-03-23 | Stop reason: HOSPADM

## 2020-03-21 RX ORDER — SODIUM CHLORIDE 9 MG/ML
INJECTION, SOLUTION INTRAVENOUS CONTINUOUS
Status: DISCONTINUED | OUTPATIENT
Start: 2020-03-21 | End: 2020-03-22

## 2020-03-21 RX ORDER — IBUPROFEN 200 MG
200 TABLET ORAL EVERY 6 HOURS PRN
COMMUNITY
End: 2020-03-24

## 2020-03-21 RX ORDER — PANTOPRAZOLE SODIUM 40 MG/1
40 TABLET, DELAYED RELEASE ORAL DAILY
Status: DISCONTINUED | OUTPATIENT
Start: 2020-03-21 | End: 2020-03-23 | Stop reason: HOSPADM

## 2020-03-21 RX ORDER — ASPIRIN 81 MG/1
81 TABLET ORAL DAILY
Status: DISCONTINUED | OUTPATIENT
Start: 2020-03-21 | End: 2020-03-23 | Stop reason: HOSPADM

## 2020-03-21 RX ORDER — SODIUM CHLORIDE 0.9 % (FLUSH) 0.9 %
10 SYRINGE (ML) INJECTION PRN
Status: DISCONTINUED | OUTPATIENT
Start: 2020-03-21 | End: 2020-03-23 | Stop reason: HOSPADM

## 2020-03-21 RX ORDER — 0.9 % SODIUM CHLORIDE 0.9 %
1000 INTRAVENOUS SOLUTION INTRAVENOUS ONCE
Status: COMPLETED | OUTPATIENT
Start: 2020-03-21 | End: 2020-03-21

## 2020-03-21 RX ORDER — CLOPIDOGREL BISULFATE 75 MG/1
75 TABLET ORAL DAILY
Status: DISCONTINUED | OUTPATIENT
Start: 2020-03-21 | End: 2020-03-21

## 2020-03-21 RX ORDER — PROMETHAZINE HYDROCHLORIDE 25 MG/1
12.5 TABLET ORAL EVERY 6 HOURS PRN
Status: DISCONTINUED | OUTPATIENT
Start: 2020-03-21 | End: 2020-03-23 | Stop reason: HOSPADM

## 2020-03-21 RX ADMIN — CEFTRIAXONE 1 G: 1 INJECTION, POWDER, FOR SOLUTION INTRAMUSCULAR; INTRAVENOUS at 20:51

## 2020-03-21 RX ADMIN — Medication 50 MCG: at 15:55

## 2020-03-21 RX ADMIN — INSULIN LISPRO 1 UNITS: 100 INJECTION, SOLUTION INTRAVENOUS; SUBCUTANEOUS at 20:50

## 2020-03-21 RX ADMIN — METOPROLOL TARTRATE 75 MG: 50 TABLET, FILM COATED ORAL at 20:48

## 2020-03-21 RX ADMIN — SODIUM CHLORIDE: 9 INJECTION, SOLUTION INTRAVENOUS at 17:37

## 2020-03-21 RX ADMIN — PANTOPRAZOLE SODIUM 40 MG: 40 TABLET, DELAYED RELEASE ORAL at 17:44

## 2020-03-21 RX ADMIN — SODIUM CHLORIDE 1000 ML: 9 INJECTION, SOLUTION INTRAVENOUS at 15:18

## 2020-03-21 RX ADMIN — ASPIRIN 81 MG: 81 TABLET, COATED ORAL at 17:44

## 2020-03-21 ASSESSMENT — PAIN DESCRIPTION - PAIN TYPE
TYPE: ACUTE PAIN

## 2020-03-21 ASSESSMENT — ENCOUNTER SYMPTOMS
NAUSEA: 0
COUGH: 0
CHEST TIGHTNESS: 0
VOICE CHANGE: 0
VOMITING: 0
DIARRHEA: 0
BLOOD IN STOOL: 0
BACK PAIN: 1
SHORTNESS OF BREATH: 0
TROUBLE SWALLOWING: 0
ABDOMINAL PAIN: 0

## 2020-03-21 ASSESSMENT — PAIN DESCRIPTION - FREQUENCY
FREQUENCY: CONTINUOUS

## 2020-03-21 ASSESSMENT — PAIN DESCRIPTION - ORIENTATION
ORIENTATION: LOWER

## 2020-03-21 ASSESSMENT — PAIN SCALES - GENERAL
PAINLEVEL_OUTOF10: 5
PAINLEVEL_OUTOF10: 8
PAINLEVEL_OUTOF10: 8
PAINLEVEL_OUTOF10: 5
PAINLEVEL_OUTOF10: 7
PAINLEVEL_OUTOF10: 4

## 2020-03-21 ASSESSMENT — PAIN DESCRIPTION - LOCATION
LOCATION: BACK

## 2020-03-21 ASSESSMENT — PAIN DESCRIPTION - DESCRIPTORS
DESCRIPTORS: ACHING
DESCRIPTORS: CONSTANT
DESCRIPTORS: ACHING
DESCRIPTORS: ACHING

## 2020-03-21 NOTE — ED PROVIDER NOTES
(non-ST elevated myocardial infarction) ([facility] [address] 03/24/2018    Unspecified cerebral artery occlusion with cerebral infarction 2003       SURGICAL HISTORY       Past Surgical History:   Procedure Laterality Date    CARDIAC CATHETERIZATION  03/26/2018    DR [address]   Annalise Vasquez CHOLECYSTECTOMY      COLONOSCOPY      CORONARY ANGIOPLASTY WITH STENT PLACEMENT  2002    EYE SURGERY      LEG SURGERY Bilateral     TOE SURGERY Right 8/21/15    3rd digit skin plasty/Dr. Samir Quijano. agrawal C[facility]    TONSILLECTOMY         CURRENT MEDICATIONS       Previous Medications    ASPIRIN 81 MG TABLET    Take 81 mg by mouth daily    ATORVASTATIN (LIPITOR) 40 MG TABLET    Take 1 tablet by mouth nightly    CLOPIDOGREL (PLAVIX) 75 MG TABLET    Take 1 tablet by mouth daily    ESOMEPRAZOLE MAGNESIUM (NEXIUM) 40 MG PACK    Take 40 mg by mouth daily    GLIPIZIDE (GLUCOTROL) 5 MG TABLET    Take 5 mg by mouth daily    IBUPROFEN (ADVIL;MOTRIN) 200 MG TABLET    Take 200 mg by mouth every 6 hours as needed for Pain    METFORMIN (GLUCOPHAGE) 1000 MG TABLET    Take 1,000 mg by mouth daily (with breakfast)    METOPROLOL (TOPROL-XL) 50 MG XL TABLET    Take 50 mg by mouth daily       ALLERGIES     Patient has no known allergies. FAMILY HISTORY     History reviewed. No pertinent family history.      SOCIAL HISTORY       Social History     Socioeconomic History    Marital status:      Spouse name: None    Number of children: None    Years of education: None    Highest education level: None   Occupational History    None   Social Needs    Financial resource strain: None    Food insecurity     Worry: None     Inability: None    Transportation needs     Medical: None     Non-medical: None   Tobacco Use    Smoking status: Never Smoker    Smokeless tobacco: Never Used   Substance and Sexual Activity    Alcohol use: Yes     Comment: occ    Drug use: None    Sexual activity: None   Lifestyle    Physical activity     Days per week: None Minutes per session: None    Stress: None   Relationships    Social connections     Talks on phone: None     Gets together: None     Attends Zoroastrianism service: None     Active member of club or organization: None     Attends meetings of clubs or organizations: None     Relationship status: None    Intimate partner violence     Fear of current or ex partner: None     Emotionally abused: None     Physically abused: None     Forced sexual activity: None   Other Topics Concern    None   Social History Narrative    None       REVIEW OF SYSTEMS     Review of Systems   Constitutional: Negative for chills, diaphoresis and fever. HENT: Negative for trouble swallowing and voice change. Eyes: Negative for visual disturbance. Respiratory: Negative for cough, chest tightness and shortness of breath. Cardiovascular: Negative for chest pain and leg swelling. Gastrointestinal: Negative for abdominal pain, blood in stool, diarrhea, nausea and vomiting. Genitourinary: Negative for dysuria, frequency and hematuria. Musculoskeletal: Positive for back pain. Negative for neck pain. Skin: Negative for rash and wound. Neurological: Negative for speech difficulty, weakness, numbness and headaches. Psychiatric/Behavioral: Negative for confusion. Except as noted above the remainder of the review of systems was reviewed and is. PHYSICAL EXAM    (up to 7 for level 4, 8 or more for level 5)     ED Triage Vitals [03/21/20 1235]   BP Temp Temp Source Pulse Resp SpO2 Height Weight   (!) 175/53 97.6 °F (36.4 °C) Oral 70 18 99 % -- --       Physical Exam  Vitals signs and nursing note reviewed. Constitutional:       General: She is not in acute distress. Appearance: She is well-developed. She is not ill-appearing, toxic-appearing or diaphoretic. HENT:      Head: Normocephalic and atraumatic. Eyes:      General: No scleral icterus.      Conjunctiva/sclera: Conjunctivae normal.      Right eye: Right WITH AUTO DIFFERENTIAL - Abnormal; Notable for the following components:       Result Value    Seg Neutrophils 81 (*)     Lymphocytes 8 (*)     Segs Absolute 8.59 (*)     Absolute Lymph # 0.87 (*)     All other components within normal limits   BASIC METABOLIC PANEL W/ REFLEX TO MG FOR LOW K - Abnormal; Notable for the following components:    Glucose 234 (*)     BUN 30 (*)     CREATININE 1.93 (*)     Sodium 127 (*)     Chloride 92 (*)     GFR Non- 25 (*)     GFR  30 (*)     All other components within normal limits   URINALYSIS - Abnormal; Notable for the following components:    Specific Gravity, UA 1.025 (*)     Protein, UA 2+ (*)     Leukocyte Esterase, Urine SMALL (*)     All other components within normal limits   MICROSCOPIC URINALYSIS - Abnormal; Notable for the following components:    Bacteria, UA TRACE (*)     All other components within normal limits       All other labs were within normal range or not returned as of this dictation. Please note, any cultures that may have been sent were not resulted at the time of this patient visit. EMERGENCY DEPARTMENT COURSE andMedical Decision Making:     MDM/   Patient presents for continued low back pain secondary to compression fracture, has no neurological deficits. Placement, which were not able to do on a weekend. However in the course of her evaluation, she is noted to be dehydrated, hyponatremic, and has a UTI. She will be admitted for further evaluation and care    ED Medications administered this visit:    Medications   0.9 % sodium chloride bolus (has no administration in time range)         Procedures: (None if blank)       CLINICAL       1. Dehydration    2. Urinary tract infection without hematuria, site unspecified    3. Hyponatremia          DISPOSITION/PLAN   DISPOSITION Decision To Admit 03/21/2020 03:09:15 PM      PATIENT REFERRED TO:  No follow-up provider specified.     DISCHARGE MEDICATIONS:  New

## 2020-03-21 NOTE — H&P
Hospital Medicine  History and Physical    Patient:  Miguel Johnson  MRN: [id number]    Chief Complaint:  Back pain, inability to ambulate. weakness    History Obtained From:  patient, electronic medical record  PCP: Evangelina Mejia MD    History of Present Illness: The patient is a 81 y.o. female who presents with a history of acute fx L4 4 days ago. Was sent home but has been unable to walk and has extreme weakness and pain. Also has had minimal weakness and pain. Seen in ER and found to be dehydrated, weak, possible UTI. Admitted for therapy and placement. Past Medical History:        Diagnosis Date    Arthritis     CAD (coronary artery disease)     Clinical trial participant at discharge 03/26/2018     disqualified    Diabetes mellitus ([facility] [address]     GERD (gastroesophageal reflux disease)     H/O cardiovascular stress test 11/2017    NORMAL    Hyperlipidemia     Hypertension     NSTEMI (non-ST elevated myocardial infarction) ([facility] [address] 03/24/2018    Unspecified cerebral artery occlusion with cerebral infarction 2003       Past Surgical History:        Procedure Laterality Date    CARDIAC CATHETERIZATION  03/26/2018    DR Le Saavedra    CHOLECYSTECTOMY      COLONOSCOPY      CORONARY ANGIOPLASTY WITH STENT PLACEMENT  2002    EYE SURGERY      LEG SURGERY Bilateral     TOE SURGERY Right 8/21/15    3rd digit skin plasty/Dr. Maria Dolores Adams. agrawal CRNA/[facility]    TONSILLECTOMY         Medications Prior to Admission:    Prior to Admission medications    Medication Sig Start Date End Date Taking?  Authorizing Provider   METOPROLOL TARTRATE PO Take 75 mg by mouth 2 times daily   Yes Historical Provider, MD   esomeprazole Magnesium (NEXIUM) 40 MG PACK Take 40 mg by mouth daily   Yes Historical Provider, MD   metoprolol (TOPROL-XL) 50 MG XL tablet Take 50 mg by mouth daily   Yes Historical Provider, MD   glipiZIDE (GLUCOTROL) 5 MG tablet Take 5 mg by mouth 2 times daily (before meals) Extended release   Yes

## 2020-03-21 NOTE — ED NOTES
[facility] called, spoke with Patience Kimberly, she stated that \"I told the  that I don't have a doctor to admit her on the weekend but if you can't manage her at home, take her to the ER\"     Joshua Martin  03/21/20 4925

## 2020-03-21 NOTE — ED NOTES
Spoke with  on phone, update given. He wants wife admitted to [facility]. Explained to him [facility] unable to accept patient on weekend, [facility] called for Dr Carlitos Archuleta and they will not admit on weekend. Instructed she will be probably be discharged.      Jose Alejandro Loza RN  03/21/20 0595

## 2020-03-21 NOTE — ED NOTES
Macey KINNEY called at home per Dr Zunilda Vernon. I explained situation and what Joseph Bains at [facility] said. Stella Wyatt also stated that she doesn't have a doctor to admit there. The family is just going to have to take her home and work on it on Monday.      Zulay Neff  03/21/20 7203

## 2020-03-22 LAB
ANION GAP SERPL CALCULATED.3IONS-SCNC: 11 MMOL/L (ref 9–17)
BUN BLDV-MCNC: 22 MG/DL (ref 8–23)
BUN/CREAT BLD: 15 (ref 9–20)
CALCIUM SERPL-MCNC: 8.7 MG/DL (ref 8.6–10.4)
CHLORIDE BLD-SCNC: 102 MMOL/L (ref 98–107)
CO2: 20 MMOL/L (ref 20–31)
CREAT SERPL-MCNC: 1.43 MG/DL (ref 0.5–0.9)
EKG ATRIAL RATE: 60 BPM
EKG P AXIS: 11 DEGREES
EKG P-R INTERVAL: 134 MS
EKG Q-T INTERVAL: 434 MS
EKG QRS DURATION: 106 MS
EKG QTC CALCULATION (BAZETT): 434 MS
EKG R AXIS: -4 DEGREES
EKG T AXIS: 95 DEGREES
EKG VENTRICULAR RATE: 60 BPM
GFR AFRICAN AMERICAN: 42 ML/MIN
GFR NON-AFRICAN AMERICAN: 35 ML/MIN
GFR SERPL CREATININE-BSD FRML MDRD: ABNORMAL ML/MIN/{1.73_M2}
GFR SERPL CREATININE-BSD FRML MDRD: ABNORMAL ML/MIN/{1.73_M2}
GLUCOSE BLD-MCNC: 131 MG/DL (ref 74–100)
GLUCOSE BLD-MCNC: 185 MG/DL (ref 74–100)
GLUCOSE BLD-MCNC: 194 MG/DL (ref 74–100)
GLUCOSE BLD-MCNC: 87 MG/DL (ref 74–100)
GLUCOSE BLD-MCNC: 99 MG/DL (ref 70–99)
POTASSIUM SERPL-SCNC: 4.6 MMOL/L (ref 3.7–5.3)
SODIUM BLD-SCNC: 133 MMOL/L (ref 135–144)

## 2020-03-22 PROCEDURE — 6360000002 HC RX W HCPCS: Performed by: INTERNAL MEDICINE

## 2020-03-22 PROCEDURE — 80048 BASIC METABOLIC PNL TOTAL CA: CPT

## 2020-03-22 PROCEDURE — 97162 PT EVAL MOD COMPLEX 30 MIN: CPT

## 2020-03-22 PROCEDURE — 6370000000 HC RX 637 (ALT 250 FOR IP): Performed by: INTERNAL MEDICINE

## 2020-03-22 PROCEDURE — 2580000003 HC RX 258: Performed by: INTERNAL MEDICINE

## 2020-03-22 PROCEDURE — 97167 OT EVAL HIGH COMPLEX 60 MIN: CPT

## 2020-03-22 PROCEDURE — 36415 COLL VENOUS BLD VENIPUNCTURE: CPT

## 2020-03-22 PROCEDURE — 1200000000 HC SEMI PRIVATE

## 2020-03-22 PROCEDURE — 93010 ELECTROCARDIOGRAM REPORT: CPT | Performed by: INTERNAL MEDICINE

## 2020-03-22 PROCEDURE — 82947 ASSAY GLUCOSE BLOOD QUANT: CPT

## 2020-03-22 PROCEDURE — 93005 ELECTROCARDIOGRAM TRACING: CPT | Performed by: INTERNAL MEDICINE

## 2020-03-22 RX ADMIN — SODIUM CHLORIDE: 9 INJECTION, SOLUTION INTRAVENOUS at 09:32

## 2020-03-22 RX ADMIN — METFORMIN HYDROCHLORIDE 1000 MG: 500 TABLET ORAL at 07:29

## 2020-03-22 RX ADMIN — HYDROCODONE BITARTRATE AND ACETAMINOPHEN 1 TABLET: 5; 325 TABLET ORAL at 10:10

## 2020-03-22 RX ADMIN — INSULIN LISPRO 1 UNITS: 100 INJECTION, SOLUTION INTRAVENOUS; SUBCUTANEOUS at 20:20

## 2020-03-22 RX ADMIN — ASPIRIN 81 MG: 81 TABLET, COATED ORAL at 07:29

## 2020-03-22 RX ADMIN — ENOXAPARIN SODIUM 30 MG: 30 INJECTION SUBCUTANEOUS at 07:31

## 2020-03-22 RX ADMIN — SODIUM CHLORIDE: 9 INJECTION, SOLUTION INTRAVENOUS at 01:29

## 2020-03-22 RX ADMIN — Medication 10 ML: at 20:21

## 2020-03-22 RX ADMIN — CEFTRIAXONE 1 G: 1 INJECTION, POWDER, FOR SOLUTION INTRAMUSCULAR; INTRAVENOUS at 20:19

## 2020-03-22 RX ADMIN — PANTOPRAZOLE SODIUM 40 MG: 40 TABLET, DELAYED RELEASE ORAL at 07:29

## 2020-03-22 RX ADMIN — METOPROLOL TARTRATE 75 MG: 50 TABLET, FILM COATED ORAL at 20:19

## 2020-03-22 RX ADMIN — PROMETHAZINE HYDROCHLORIDE 12.5 MG: 25 TABLET ORAL at 14:44

## 2020-03-22 RX ADMIN — METOPROLOL TARTRATE 75 MG: 50 TABLET, FILM COATED ORAL at 10:10

## 2020-03-22 RX ADMIN — INSULIN LISPRO 1 UNITS: 100 INJECTION, SOLUTION INTRAVENOUS; SUBCUTANEOUS at 17:30

## 2020-03-22 RX ADMIN — HYDROCODONE BITARTRATE AND ACETAMINOPHEN 1 TABLET: 5; 325 TABLET ORAL at 03:27

## 2020-03-22 ASSESSMENT — PAIN SCALES - GENERAL
PAINLEVEL_OUTOF10: 0
PAINLEVEL_OUTOF10: 5
PAINLEVEL_OUTOF10: 8
PAINLEVEL_OUTOF10: 6
PAINLEVEL_OUTOF10: 0
PAINLEVEL_OUTOF10: 0
PAINLEVEL_OUTOF10: 3
PAINLEVEL_OUTOF10: 6
PAINLEVEL_OUTOF10: 6
PAINLEVEL_OUTOF10: 5

## 2020-03-22 ASSESSMENT — PAIN DESCRIPTION - ORIENTATION: ORIENTATION: LOWER

## 2020-03-22 ASSESSMENT — PAIN DESCRIPTION - LOCATION: LOCATION: BACK

## 2020-03-22 ASSESSMENT — PAIN DESCRIPTION - FREQUENCY: FREQUENCY: CONTINUOUS

## 2020-03-22 ASSESSMENT — PAIN DESCRIPTION - DESCRIPTORS: DESCRIPTORS: ACHING

## 2020-03-22 ASSESSMENT — PAIN DESCRIPTION - PAIN TYPE: TYPE: ACUTE PAIN

## 2020-03-22 NOTE — PROGRESS NOTES
Physical Therapy    Facility/Department: [facility] THE [facility] MED SURG  Initial Assessment    NAME: Vera Jensen  : 1935  MRN: [id number]    Date of Service: 3/22/2020    Discharge Recommendations:  [facility] with PT, Subacute/Skilled Nursing Facility, Continue to assess pending progress        Assessment   Body structures, Functions, Activity limitations: Decreased functional mobility ; Decreased ADL status; Decreased endurance;Decreased posture;Decreased strength;Decreased balance;Decreased safe awareness; Increased pain  Assessment: Pt requires max +1 assist with all mobility. Pt balance is poor sitting edge of bed and with transfers. Pt is non-ambulatory and is w/c bound per her report when at home. Pt does require the assist of family to transfer, she is not independent at baseline  Treatment Diagnosis: general weakness  Prognosis: Good  Decision Making: Medium Complexity  PT Education: PT Role;Plan of Care;Home Exercise Program;Transfer Training;General Safety  Activity Tolerance  Activity Tolerance: Patient Tolerated treatment well       Patient Diagnosis(es): The primary encounter diagnosis was Dehydration. Diagnoses of Urinary tract infection without hematuria, site unspecified and Hyponatremia were also pertinent to this visit. has a past medical history of Arthritis, CAD (coronary artery disease), Clinical trial participant at discharge, Diabetes mellitus ([facility] [address] GERD (gastroesophageal reflux disease), H/O cardiovascular stress test, Hyperlipidemia, Hypertension, NSTEMI (non-ST elevated myocardial infarction) ([facility] [address] and Unspecified cerebral artery occlusion with cerebral infarction. has a past surgical history that includes Leg Surgery (Bilateral); Cholecystectomy; Colonoscopy; eye surgery; Tonsillectomy; Toe Surgery (Right, 8/21/15); Cardiac catheterization (2018); and Coronary angioplasty with stent ().     Restrictions  Restrictions/Precautions  Restrictions/Precautions: General Precautions, Fall Risk, Up as Tolerated  Required Braces or Orthoses?: No  Vision/Hearing        Subjective  General  Chart Reviewed: Yes  Patient assessed for rehabilitation services?: Yes  Follows Commands: Within Functional Limits  Subjective  Subjective: Pt states  is having difficulty transferring her anymore. Pt does complain of pain in LB with movement, 6/10. Pain Screening  Patient Currently in Pain: Yes          Orientation  Orientation  Overall Orientation Status: Within Functional Limits  Social/Functional History  Social/Functional History  Lives With: Spouse, Son  Type of Home: House  Home Layout: One level  Home Access: Ramped entrance  Bathroom Shower/Tub: Tub/Shower unit  Bathroom Toilet: Handicap height  Bathroom Equipment: Grab bars in shower, Shower chair, Hand-held shower  Bathroom Accessibility: Accessible  Home Equipment: Rolling walker, [employer], [address] chair  Receives Help From: Family, Other (comment)(Has a hired )  ADL Assistance: Independent  Homemaking Assistance: Needs assistance  Meal Prep: Total  Laundry: Total  Vacuuming: Total  Cleaning: Total  Yard Work: Total  Driving: Total  Shopping:  Total  Homemaking Responsibilities: No  Ambulation Assistance: (Pt is non-ambulatory)  Transfer Assistance: Needs assistance(son or  transfer to w/c)  Active : No  Occupation: Retired  Cognition     A and O x3  Objective     Observation/Palpation  Posture: Poor  Observation: pt maintains very flexed posture and noted tone in L UE    PROM RLE (degrees)  RLE PROM: WFL  PROM LLE (degrees)  LLE PROM: WFL  Strength RLE  Comment: generally 3/5 upon observation, as she was able to bear and accept weight for transfer  Strength LLE  Comment: generally 3/5 upon observation, as she was able to bear weight for transfer        Bed mobility  Rolling to Left: Maximum assistance  Supine to Sit: Maximum assistance  Scooting: Maximal assistance  Comment: pt attempted to participate but required significant assist.   Transfers  Sit to Stand: Maximum Assistance  Stand to sit: Maximum Assistance  Bed to Chair: Maximum assistance  Stand Pivot Transfers: Maximum Assistance  Ambulation  Ambulation?: No(pt is non-ambulatory)     Balance  Posture: Poor  Sitting - Static: Poor  Standing - Static: Poor        Plan   Plan  Times per week: 7  Times per day: Twice a day(with exception of weekends)  Current Treatment Recommendations: Strengthening, Patient/Caregiver Education & Training, Balance Training, Neuromuscular Re-education, Functional Mobility Training, Endurance Training, Home Exercise Program, Manual Therapy - Soft Tissue Mobilization, Transfer Training, Safety Education & Training  Plan Comment: Initiate POC  Safety Devices  Type of devices: Call light within reach, Patient at risk for falls, Left in chair, Nurse notified       AM-PAC Score  AM-PAC Inpatient Mobility Raw Score : 10 (03/22/20 1221)  AM-PAC Inpatient T-Scale Score : 32.29 (03/22/20 1221)  Mobility Inpatient CMS 0-100% Score: 76.75 (03/22/20 1221)  Mobility Inpatient CMS G-Code Modifier : CL (03/22/20 1221)          Goals  Short term goals  Time Frame for Short term goals: 10 days  Short term goal 1: Initiate and progress HEP as appropriate for functional carryover with mobility  Short term goal 2: Pt will require min to mod assist +1 with bed mobility  Short term goal 3: Pt will require min to mod assist +1 for transfer to chair  Short term goal 4: Pt will be able to maintain sitting edge of bed with min assist to CGA once positioned for up to 10 minutes to allow less dependence on caregiver.   Patient Goals   Patient goals : would like to stay in her home if she can       Therapy Time   Individual Concurrent Group Co-treatment   Time In 1130         Time Out 1200         Minutes 30                 Sean Miner, PT, DPT

## 2020-03-22 NOTE — PLAN OF CARE
Problem: Falls - Risk of:  Goal: Will remain free from falls  Description: Will remain free from falls  Outcome: Ongoing  Goal: Absence of physical injury  Description: Absence of physical injury  Outcome: Ongoing     Problem: Pain:  Goal: Pain level will decrease  Description: Pain level will decrease  Outcome: Ongoing  Goal: Control of acute pain  Description: Control of acute pain  Outcome: Ongoing  Goal: Control of chronic pain  Description: Control of chronic pain  Outcome: Ongoing     Problem: Fluid Volume:  Goal: Signs and symptoms of dehydration will decrease  Description: Signs and symptoms of dehydration will decrease  Outcome: Ongoing  Goal: Ability to achieve a balanced intake and output will improve  Description: Ability to achieve a balanced intake and output will improve  Outcome: Ongoing  Goal: Diagnostic test results will improve  Description: Diagnostic test results will improve  Outcome: Ongoing     Problem: Physical Regulation:  Goal: Complications related to the disease process, condition or treatment will be avoided or minimized  Description: Complications related to the disease process, condition or treatment will be avoided or minimized  Outcome: Ongoing  Goal: Ability to maintain vital signs within normal range will improve  Description: Ability to maintain vital signs within normal range will improve  Outcome: Ongoing

## 2020-03-22 NOTE — PROGRESS NOTES
Assessment and vitals completed at this time as charted. Patient rates pain 7/10 in back but does not want any medications at this time. Patient denies any further needs at this time. Will continue to monitor.

## 2020-03-22 NOTE — PROGRESS NOTES
03/22/2020    BUN 22 03/22/2020    CREATININE 1.43 03/22/2020    GFRAA 42 03/22/2020    LABGLOM 35 03/22/2020    GLUCOSE 99 03/22/2020    LABALBU 4.1 09/06/2012    CALCIUM 8.7 03/22/2020    BILITOT 0.40 09/06/2012    ALKPHOS 66 09/06/2012    AST 19 09/06/2012    ALT 14 09/06/2012           No results found. Hospital Problems:  Active Problems:    Dehydration with hyponatremia    Dehydration  Resolved Problems:    * No resolved hospital problems.  *      All Problems:  Patient Active Problem List   Diagnosis    Left hip pain    Acute coronary syndrome ([facility] [address]    S/P drug eluting coronary stent placement - LAD 3/26/18 - Dr. Shyam Stacy    Dehydration with hyponatremia    Dehydration       ASSESSMENT/PLAN:  ·  L4 compression and generalized weakness---PT/OT, pain control  · UTI--rocephen pending C and S  · Dehydration---corrected, DC IV fluids     HIGH RISK MEDS: none    Mary Grace Lopez M.D.

## 2020-03-22 NOTE — PROGRESS NOTES
Informed Dr. Kiet Henriquez that patient was hearing voices of her son and her  but patient was alert and oriented. Dr. Kiet Henriquez stated to continue to monitor. No new orders given at this time.

## 2020-03-23 VITALS
HEIGHT: 65 IN | WEIGHT: 162 LBS | RESPIRATION RATE: 18 BRPM | BODY MASS INDEX: 26.99 KG/M2 | HEART RATE: 60 BPM | SYSTOLIC BLOOD PRESSURE: 158 MMHG | TEMPERATURE: 98.5 F | OXYGEN SATURATION: 96 % | DIASTOLIC BLOOD PRESSURE: 77 MMHG

## 2020-03-23 PROBLEM — E11.9 TYPE 2 DIABETES MELLITUS, WITH LONG-TERM CURRENT USE OF INSULIN (HCC): Status: ACTIVE | Noted: 2020-03-23

## 2020-03-23 PROBLEM — Z79.4 TYPE 2 DIABETES MELLITUS, WITH LONG-TERM CURRENT USE OF INSULIN (HCC): Status: ACTIVE | Noted: 2020-03-23

## 2020-03-23 LAB
ANION GAP SERPL CALCULATED.3IONS-SCNC: 8 MMOL/L (ref 9–17)
BUN BLDV-MCNC: 18 MG/DL (ref 8–23)
BUN/CREAT BLD: 13 (ref 9–20)
CALCIUM SERPL-MCNC: 8.5 MG/DL (ref 8.6–10.4)
CHLORIDE BLD-SCNC: 103 MMOL/L (ref 98–107)
CO2: 22 MMOL/L (ref 20–31)
CREAT SERPL-MCNC: 1.41 MG/DL (ref 0.5–0.9)
CULTURE: NORMAL
GFR AFRICAN AMERICAN: 43 ML/MIN
GFR NON-AFRICAN AMERICAN: 36 ML/MIN
GFR SERPL CREATININE-BSD FRML MDRD: ABNORMAL ML/MIN/{1.73_M2}
GFR SERPL CREATININE-BSD FRML MDRD: ABNORMAL ML/MIN/{1.73_M2}
GLUCOSE BLD-MCNC: 213 MG/DL (ref 74–100)
GLUCOSE BLD-MCNC: 228 MG/DL (ref 70–99)
GLUCOSE BLD-MCNC: 97 MG/DL (ref 74–100)
Lab: NORMAL
POTASSIUM SERPL-SCNC: 4.6 MMOL/L (ref 3.7–5.3)
SODIUM BLD-SCNC: 133 MMOL/L (ref 135–144)
SPECIMEN DESCRIPTION: NORMAL

## 2020-03-23 PROCEDURE — 97530 THERAPEUTIC ACTIVITIES: CPT

## 2020-03-23 PROCEDURE — 82947 ASSAY GLUCOSE BLOOD QUANT: CPT

## 2020-03-23 PROCEDURE — 36415 COLL VENOUS BLD VENIPUNCTURE: CPT

## 2020-03-23 PROCEDURE — 80048 BASIC METABOLIC PNL TOTAL CA: CPT

## 2020-03-23 PROCEDURE — 2580000003 HC RX 258: Performed by: INTERNAL MEDICINE

## 2020-03-23 PROCEDURE — 6370000000 HC RX 637 (ALT 250 FOR IP): Performed by: INTERNAL MEDICINE

## 2020-03-23 PROCEDURE — 6360000002 HC RX W HCPCS: Performed by: INTERNAL MEDICINE

## 2020-03-23 PROCEDURE — 97535 SELF CARE MNGMENT TRAINING: CPT

## 2020-03-23 PROCEDURE — 97110 THERAPEUTIC EXERCISES: CPT

## 2020-03-23 RX ORDER — HYDROCODONE BITARTRATE AND ACETAMINOPHEN 5; 325 MG/1; MG/1
1 TABLET ORAL EVERY 6 HOURS PRN
Qty: 12 TABLET | Refills: 0 | Status: SHIPPED | OUTPATIENT
Start: 2020-03-23 | End: 2020-03-26

## 2020-03-23 RX ADMIN — INSULIN LISPRO 2 UNITS: 100 INJECTION, SOLUTION INTRAVENOUS; SUBCUTANEOUS at 12:36

## 2020-03-23 RX ADMIN — HYDROCODONE BITARTRATE AND ACETAMINOPHEN 1 TABLET: 5; 325 TABLET ORAL at 13:17

## 2020-03-23 RX ADMIN — ENOXAPARIN SODIUM 30 MG: 30 INJECTION SUBCUTANEOUS at 08:35

## 2020-03-23 RX ADMIN — PANTOPRAZOLE SODIUM 40 MG: 40 TABLET, DELAYED RELEASE ORAL at 08:35

## 2020-03-23 RX ADMIN — METFORMIN HYDROCHLORIDE 1000 MG: 500 TABLET ORAL at 08:34

## 2020-03-23 RX ADMIN — ASPIRIN 81 MG: 81 TABLET, COATED ORAL at 08:34

## 2020-03-23 RX ADMIN — HYDROCODONE BITARTRATE AND ACETAMINOPHEN 1 TABLET: 5; 325 TABLET ORAL at 06:58

## 2020-03-23 RX ADMIN — METOPROLOL TARTRATE 75 MG: 50 TABLET, FILM COATED ORAL at 08:34

## 2020-03-23 RX ADMIN — Medication 10 ML: at 08:35

## 2020-03-23 ASSESSMENT — PAIN SCALES - GENERAL
PAINLEVEL_OUTOF10: 4
PAINLEVEL_OUTOF10: 4
PAINLEVEL_OUTOF10: 0
PAINLEVEL_OUTOF10: 9
PAINLEVEL_OUTOF10: 4
PAINLEVEL_OUTOF10: 9
PAINLEVEL_OUTOF10: 5

## 2020-03-23 ASSESSMENT — PAIN DESCRIPTION - LOCATION
LOCATION: BACK
LOCATION: HIP
LOCATION: BACK

## 2020-03-23 ASSESSMENT — PAIN DESCRIPTION - ORIENTATION
ORIENTATION: LOWER
ORIENTATION: LOWER
ORIENTATION: RIGHT

## 2020-03-23 ASSESSMENT — PAIN DESCRIPTION - PAIN TYPE
TYPE: ACUTE PAIN

## 2020-03-23 NOTE — PROGRESS NOTES
Occupational Therapy  Facility/Department: [facility] MED SURG  Daily Treatment Note  NAME: Marilee Fountain  : 1935  MRN: [id number]    Date of Service: 3/23/2020    Discharge Recommendations:  Subacute/Skilled Nursing Facility       Assessment      OT Education: OT Role;Precautions; Equipment;ADL Adaptive Strategies  Patient Education: Educated patient on discharge folder specifically in regards to fall precautions, ADL adaptive strategies including information on sock aide and reacher, Fair understanding noted. Activity Tolerance  Activity Tolerance: Patient limited by fatigue;Patient limited by pain  Activity Tolerance: Poor  Safety Devices  Safety Devices in place: Yes  Type of devices: Left in bed;Call light within reach;Nurse notified         Patient Diagnosis(es): The primary encounter diagnosis was Dehydration. Diagnoses of Urinary tract infection without hematuria, site unspecified, Hyponatremia, and Left hip pain were also pertinent to this visit. has a past medical history of Arthritis, CAD (coronary artery disease), Clinical trial participant at discharge, Diabetes mellitus ([facility] [address] GERD (gastroesophageal reflux disease), H/O cardiovascular stress test, Hyperlipidemia, Hypertension, NSTEMI (non-ST elevated myocardial infarction) ([facility] [address] and Unspecified cerebral artery occlusion with cerebral infarction. has a past surgical history that includes Leg Surgery (Bilateral); Cholecystectomy; Colonoscopy; eye surgery; Tonsillectomy; Toe Surgery (Right, 8/21/15); Cardiac catheterization (2018); and Coronary angioplasty with stent ().     Restrictions  Restrictions/Precautions  Restrictions/Precautions: General Precautions, Fall Risk, Up as Tolerated  Required Braces or Orthoses?: No  Subjective   General  Chart Reviewed: Yes  Patient assessed for rehabilitation services?: Yes  Additional Pertinent Hx: HX of CVA  Family / Caregiver Present: No  Referring Practitioner: Dr. Mykel Klein  Diagnosis: L4 compression FX  Subjective  Subjective: Patient lying in bed upon arrival, reported 10/10 pain in right leg. Agreeable to trial BUE exercises while lying in bed. Refusal of ADLs secondary to pain. Orientation: WFL     Objective             Cognition  Overall Cognitive Status: WFL                    Type of ROM/Therapeutic Exercise  Type of ROM/Therapeutic Exercise: AAROM;AROM  Comment: AAROM to LUE X 10 reps in flexion, abduction. AROM to LUE elbow flexion, pronation/supination X 10 reps, 1 rest break due to fatigue. RUE free weights 1# X5 reps in 4 planes, RB between each plane due to increased pain/fatigue reported. Plan   Plan  Times per week: 7X per week  Times per day: Daily  Plan weeks: 2 weeks  Specific instructions for Next Treatment: ADL retrainig/compensatory techniques to minimize back pain with LE ADLs via AE. Current Treatment Recommendations: Strengthening, Pain Management, Positioning, Safety Education & Training, Self-Care / ADL, Functional Mobility Training       Goals  Short term goals  Time Frame for Short term goals: 10  days  Short term goal 1: Pt will be MI to complete all UE ADLs including bathing and dressing  Short term goal 2: Pt will display increased functional use of L hand: fine motor skills will be sufficient to complete all clothing fasteners, use bedside remote and retrieve items from overbed table. Short term goal 3: Pt will be min assist to complete all LE ADLs including bathing and dressing, with or without AE  Short term goal 4: Pt will display the functional endurance to tolerate 15 to 20 min of functional ADL activity, without complaint of increased fatigue. Long term goals  Time Frame for Long term goals : 10 days  Long term goal 1: Pt will be  min assist to safely transfer to/from bed to [facility] with an AD and gait belt. Patient Goals   Patient goals : To return home ASAP at  her OF for ADLs and mobility.        Therapy Time   Individual

## 2020-03-23 NOTE — PLAN OF CARE
Problem: Falls - Risk of:  Goal: Will remain free from falls  Description: Will remain free from falls  Note: Patient is HFR, rails up x2, bed alarm activated, signs posted, call light in reach. Patient is non ambulatory at this time, using bed pan to urinate. Patient has had no falls this shift. Patient educated on safety in bed and calling out for help   Goal: Absence of physical injury  Description: Absence of physical injury  Note: No injury      Problem: Pain:  Goal: Pain level will decrease  Description: Pain level will decrease  Note: Patient without complaints of pain   Goal: Control of acute pain  Description: Control of acute pain  Note: No pain reported      Problem: Fluid Volume:  Goal: Signs and symptoms of dehydration will decrease  Description: Signs and symptoms of dehydration will decrease  Note: Patient intake and output being monitored. Patient has used bedpan x2 and has drank some water this shift.    Goal: Diagnostic test results will improve  Description: Diagnostic test results will improve  Note: See labs

## 2020-03-23 NOTE — PROGRESS NOTES
Pt called out stating that she was done going to the bathroom. Writer to bedside, pt states that she's sitting on a bed pan that needs emptied. Per Francis Bo RN, pt was from bedpan a while ago. Pt believes that she is still on pan. Pt incontinent of large amount of urine. Pericare provided. Pt apologetic for being incontinent. Emotional support provided. Pt denies other needs at this time. Bed alarm active. Will check FSBS shortly.

## 2020-03-23 NOTE — PROGRESS NOTES
Progress Note    SUBJECTIVE:  F/u on weakness, dehydration    OBJECTIVE:  Resting in bed alert and oriented. Answers questions appropriately. Denies complaints at this time. Afebrile. Denies CP or SOB. Vitals:   Vitals:    03/23/20 0957   BP: 134/74   Pulse: 58   Resp:    Temp:    SpO2:      Weight: 162 lb (73.5 kg)   Height: 5' 5\" (165.1 cm)   -----------------------------------------------------------------  Exam:    CONSTITUTIONAL:  awake, alert, cooperative, no apparent distress, and appears stated age  EYES:  Lids and lashes normal, pupils equal, round and reactive to light, extra ocular muscles intact, sclera clear, conjunctiva normal  ENT:  normocepalic, without obvious abnormality  NECK:  supple, symmetrical, trachea midline, skin normal and no stridor  HEMATOLOGIC/LYMPHATICS:  no cervical lymphadenopathy and no supraclavicular lymphadenopathy  LUNGS:  No increased work of breathing, good air exchange, clear to auscultation bilaterally, no crackles or wheezing  CARDIOVASCULAR:  Normal apical impulse, regular rate and rhythm, normal S1 and S2, no S3 or S4, and no murmur noted. RSR with BBB  ABDOMEN:  No scars, normal bowel sounds, soft, non-distended, non-tender, no masses palpated, no hepatosplenomegally  MUSCULOSKELETAL:  there is no redness, warmth, or swelling of the joints  NEUROLOGIC:  Mental Status Exam:  Level of Alertness:   awake  Orientation:   person, place, time  Memory:   normal  SKIN:  no bruising or bleeding, normal skin color, texture, turgor, no redness, warmth, or swelling, no rashes and no lesions  EXT:     no cyanosis, clubbing or edema present    -----------------------------------------------------------------  Diagnostic Data: Reviewed    ASSESSMENT:   Active Problems:    Dehydration with hyponatremia    Dehydration  Resolved Problems:    * No resolved hospital problems.  *        PLAN:  · Dehydration--Resolved  · Hyponatremia  · BMP Daily  · PT Eval and Treat  · Diabetes Mellitus  · POCT AC & HS  · Low Dose Sliding Scale -- Humalog  · Carb Control diet  · Discharge Planning--Placement due to family unable to care for patient      JEFFREY Zamudio, NP-C

## 2020-03-23 NOTE — PROGRESS NOTES
Pt on call light, writer to bedside, pt states that she needs to use the bed pan. Pt placed on bed pan one assist. Pt continent of urine at this time. Pt c/o 4/10 back pain, will medicate pt. Vitals obtained, pressure elevated, will update physician. Pt is alert and oriented times four at this time. Morning assessment completed. See flow sheet for details. Pt denies further needs at this time. Call light in reach, bed alarm active. Will continue to monitor.

## 2020-03-23 NOTE — PROGRESS NOTES
Physical Therapy  Facility/Department: [facility] AT THE [facility] MED SURG  Daily Treatment Note  NAME: Dominic Ritter  : 1935  MRN: [id number]    Date of Service: 3/23/2020    Discharge Recommendations:  ECF with PT, Subacute/Skilled Nursing Facility, Continue to assess pending progress        Assessment   Assessment: Pt performs supine BLE exercises this date to maintain/progress strength and mobility. Pt able to roll to L with Patricia, but requires ModA with rolling to R d/t L-sided weakness and inability to reach for bed rail with LUE for assistance. Pt refusing to sit EOB this morning d/t fatigue and waiting for lunch; pt informed on plan to attempt to sit EOB this afternoon. Treatment Diagnosis: general weakness  Prognosis: Good  PT Education: PT Role  Patient Education: Intructed in purpose/benefits of therapy and plan to sit EOB this afternoon. Activity Tolerance  Activity Tolerance: Patient Tolerated treatment well     Patient Diagnosis(es): The primary encounter diagnosis was Dehydration. Diagnoses of Urinary tract infection without hematuria, site unspecified and Hyponatremia were also pertinent to this visit. has a past medical history of Arthritis, CAD (coronary artery disease), Clinical trial participant at discharge, Diabetes mellitus ([facility] [address] GERD (gastroesophageal reflux disease), H/O cardiovascular stress test, Hyperlipidemia, Hypertension, NSTEMI (non-ST elevated myocardial infarction) ([facility] [address] and Unspecified cerebral artery occlusion with cerebral infarction. has a past surgical history that includes Leg Surgery (Bilateral); Cholecystectomy; Colonoscopy; eye surgery; Tonsillectomy; Toe Surgery (Right, 8/21/15); Cardiac catheterization (2018); and Coronary angioplasty with stent ().     Restrictions  Restrictions/Precautions  Restrictions/Precautions: General Precautions, Fall Risk, Up as Tolerated  Required Braces or Orthoses?: No  Subjective   General  Chart Reviewed: Yes  Subjective  Subjective: Pt denies pain upon arrival to pt's room. Pt states pain is minimal as long as she doesn't move. Orientation  Orientation  Overall Orientation Status: Within Functional Limits  Cognition   Cognition  Overall Cognitive Status: WFL  Objective   Bed mobility  Bridging: Dependent/Total  Rolling to Left: Moderate assistance  Rolling to Right: Minimal assistance  Scooting: Maximal assistance              Exercises  Straight Leg Raise: x10  Quad Sets: 2x5  Heelslides: x10  Gluteal Sets: -- attempted but pt c/o increased LBP therefore held this ex at this time  Hip Abduction: x10; and hip ADD pillow squeezes x10  Knee Short Arc Quad: x10  Ankle Pumps: x20  Comments: Supine BLE exercises completed. Goals  Short term goals  Time Frame for Short term goals: 10 days  Short term goal 1: Initiate and progress HEP as appropriate for functional carryover with mobility  Short term goal 2: Pt will require min to mod assist +1 with bed mobility  Short term goal 3: Pt will require min to mod assist +1 for transfer to chair  Short term goal 4: Pt will be able to maintain sitting edge of bed with min assist to CGA once positioned for up to 10 minutes to allow less dependence on caregiver. Patient Goals   Patient goals : would like to stay in her home if she can    Plan    Plan  Times per week: 7  Times per day: Twice a day  Current Treatment Recommendations: Strengthening, Patient/Caregiver Education & Training, Balance Training, Neuromuscular Re-education, Functional Mobility Training, Endurance Training, Home Exercise Program, Manual Therapy - Soft Tissue Mobilization, Transfer Training, Safety Education & Training  Plan Comment: Initiate POC  Safety Devices  Type of devices:  All fall risk precautions in place, Bed alarm in place     Therapy Time   Individual Concurrent Group Co-treatment   Time In 1125         Time Out 1155         Minutes 30                 Honey Means

## 2020-03-23 NOTE — DISCHARGE INSTR - COC
Continuity of Care Form    Patient Name: Lennox Chacon   :  1935  MRN:  [id number]    Admit date:  3/21/2020  Discharge date:      Code Status Order: Full Code   Advance Directives:   [facility] Documentation     Date/Time Healthcare Directive Type of Healthcare Directive Copy in [address] Agent's Name Healthcare Agent's Phone Number    20 2938  Other (Comment) Pt. states she has this in a trust at home. -- -- -- -- --    20 1628  No, patient does not have an advance directive for healthcare treatment -- -- -- -- --          Admitting Physician:  Lila Johnson MD  PCP: Keith White MD    Discharging Nurse: Marie Araujo Unit/Room#: 3456/6369-86  Discharging Unit Phone Number: [phone]    Emergency Contact:   Extended Emergency Contact Information  Primary Emergency Contact: Peterson Garcia  Address: [address]  Home Phone: [phone]  Relation: Spouse  Secondary Emergency Contact: Lisa Melo, [address] [place] [address]  Home Phone: [phone]  Work Phone: [phone]  Relation: Niece/Nephew    Past Surgical History:  Past Surgical History:   Procedure Laterality Date    CARDIAC CATHETERIZATION  2018    DR Adán Alvarado   Anton Shan CHOLECYSTECTOMY      COLONOSCOPY      CORONARY ANGIOPLASTY WITH STENT PLACEMENT      EYE SURGERY      LEG SURGERY Bilateral     TOE SURGERY Right 8/21/15    3rd digit skin plasty/Dr. Josephine Byrd. agrawal CRNA/Lila Juan    TONSILLECTOMY         Immunization History: There is no immunization history for the selected administration types on file for this patient.     Active Problems:  Patient Active Problem List   Diagnosis Code    Left hip pain M25.552    Acute coronary syndrome (HCC) I24.9    S/P drug eluting coronary stent placement - LAD 3/26/18 - Dr. Anna Barnes Z95.5    Dehydration with hyponatremia E87.1    Dehydration Physical Therapy and Occupational Therapy  Weight Bearing Status/Restrictions: No weight bearing restirctions  Other Medical Equipment (for information only, NOT a DME order):  wheelchair and walker  Other Treatments: none    Patient's personal belongings (please select all that are sent with patient):  None    RN SIGNATURE:  Electronically signed by Christiano Noonan RN on 3/23/20 at 1:26 PM EDT    CASE MANAGEMENT/SOCIAL WORK SECTION    Inpatient Status Date: 3/21/2020    Readmission Risk Assessment Score:  Readmission Risk              Risk of Unplanned Readmission:        12           Discharging to Facility/ Agency   · Name: [facility]  · Address:  [address] [place] [address]  · Phone:  [phone]  · Fax:    Dialysis Facility (if applicable)   · Name:  · Address:  · Dialysis Schedule:  · Phone:  · Fax:    / signature: Electronically signed by GREGORIA Sherwood on 3/23/20 at 1:16 PM EDT    PHYSICIAN SECTION    Prognosis: Good    Condition at Discharge: Stable    Rehab Potential (if transferring to Rehab): Fair    Recommended Labs or Other Treatments After Discharge: None    Physician Certification: I certify the above information and transfer of Pinky Mix  is necessary for the continuing treatment of the diagnosis listed and that she requires [facility] for greater 30 days.      Update Admission H&P: No change in H&P    PHYSICIAN SIGNATURE:  Electronically signed by JEFFREY Noel CNP on 3/23/20 at 1:07 PM EDT

## 2020-03-23 NOTE — DISCHARGE SUMMARY
Discharge Summary    Bailee Ventura  :  1935  MRN:  [id number]    Admit date:  3/21/2020      Discharge date: 3/23/2020     Admitting Physician:  Lauro Kam MD    Discharge Diagnoses:    Principal Problem:    Dehydration  Active Problems:    Dehydration with hyponatremia    Type 2 diabetes mellitus, with long-term current use of insulin ([address]  Resolved Problems:    * No resolved hospital problems. *      Hospital Course:   Bailee Ventura is a 81 y.o. female admitted with history of acute Fracture L4 4 days ago. Was sent home but has been unable to walk and has extreme weakness and pain. Also has had minimal weakness and pain. Seen in ER and found to be dehydrated, weak, possible UTI. Admitted for therapy and placement. Patient has remained afebrile. Labs are stable. No new complaints. She is up with therapy although she has pain. This is being managed. I will go and discharge her today to [facility]. Her family has expressed that they are unable to care for her at home at this time. Consultants:  none    Procedures: none    Complications: none    Discharge Condition: stable    Exam:  GEN:  alert and oriented to person, place and time, well-developed and well-nourished, in no acute distress  EYES: No gross abnormalities. , PERRL and EOMI  NECK: normal, supple, no lymphadenopathy,  no carotid bruits  PULM: clear to auscultation bilaterally- no wheezes, rales or rhonchi, normal air movement, no respiratory distress  COR: regular rate & rhythm, no murmurs and no gallops  ABD:  soft, non-tender, non-distended, normal bowel sounds, no masses or organomegaly  EXT:   no cyanosis, clubbing or edema present    NEURO: follows commands, ETIENNE, no deficits  SKIN:  no rashes or significant lesions    Significant Diagnostic Studies:   Lab Results   Component Value Date    WBC 10.6 2020    HGB 13.4 2020     2020       Lab Results   Component Value Date    BUN 18 2020    CREATININE 1.41 (H) 03/23/2020     (L) 03/23/2020    K 4.6 03/23/2020    CALCIUM 8.5 (L) 03/23/2020     03/23/2020    CO2 22 03/23/2020    LABGLOM 36 (L) 03/23/2020       Lab Results   Component Value Date    WBCUA 20 TO 50 03/21/2020    RBCUA 0 TO 2 03/21/2020    EPITHUA 5 TO 10 03/21/2020    LEUKOCYTESUR SMALL (A) 03/21/2020    SPECGRAV 1.025 (H) 03/21/2020    GLUCOSEU NEGATIVE 03/21/2020    KETUA NEGATIVE 03/21/2020    PROTEINU 2+ (A) 03/21/2020    HGBUR NEGATIVE 03/21/2020    CASTUA NOT REPORTED 03/21/2020    CRYSTUA NOT REPORTED 03/21/2020    BACTERIA TRACE (A) 03/21/2020    YEAST NOT REPORTED 03/21/2020       No results found. Assessment and Plan:  Patient Active Problem List    Diagnosis Date Noted    S/P drug eluting coronary stent placement - LAD 3/26/18 - Dr. Alexandra Ellis 03/26/2018     Priority: High    Type 2 diabetes mellitus, with long-term current use of insulin ([facility] [address] 03/23/2020    Dehydration with hyponatremia 03/21/2020    Dehydration 03/21/2020    Acute coronary syndrome ([facility] [address] 03/24/2018    Left hip pain 03/20/2018        Discharge Medications:       Yelitza Luciano   Britt Medication Instructions JEX:[id number]    Printed on:03/23/20 1303   Medication Information                      aspirin 81 MG tablet  Take 81 mg by mouth daily             esomeprazole Magnesium (NEXIUM) 40 MG PACK  Take 40 mg by mouth daily             glipiZIDE (GLUCOTROL) 5 MG tablet  Take 5 mg by mouth 2 times daily (before meals) Extended release             HYDROcodone-acetaminophen (NORCO) 5-325 MG per tablet  Take 1 tablet by mouth every 6 hours as needed for Pain for up to 3 days. ibuprofen (ADVIL;MOTRIN) 200 MG tablet  Take 200 mg by mouth every 6 hours as needed for Pain             metFORMIN (GLUCOPHAGE) 1000 MG tablet  Take 1,000 mg by mouth daily (with breakfast)             METOPROLOL TARTRATE PO  Take 75 mg by mouth 2 times daily                 Patient Instructions:    Activity: activity as

## 2020-03-23 NOTE — PROGRESS NOTES
Patient to be discharged to [facility] this p.m. Spoke with Patient's  to advise of same. [facility]. Discharge paperwork FAXED to facility per their request.  LSW to remain available, as needed, to assist with discharge placement.     Tio. Alexx Chu [address] [place] [address]  3/23/2020

## 2020-03-23 NOTE — PLAN OF CARE
Problem: Falls - Risk of:  Goal: Will remain free from falls  Description: Will remain free from falls  3/23/2020 0929 by Miya Linder RN  Outcome: Ongoing  Note: Call light in reach at all times, frequent checks, bed in lowest position, wheels of bed and chair locked, non skid footwear on, appropriate transfer techniques, personal items within reach, walkways free of obstructions, fall risk armband and sign displayed, Bradford fall risk score per protocol. No falls this shift, will continue to monitor. Problem: Pain:  Goal: Pain level will decrease  Description: Pain level will decrease  3/23/2020 0929 by Miya Linder RN  Outcome: Ongoing  Note: Pain assessed every four hours and as needed using 0-10 pain scale. Pt educated on scale and uses scale appropriately. Encourage pt to notify staff of pain before pain becomes uncontrollable. Correlate periods of heavy activity after pain medication administration. Use pharmacological and non pharmacological methods for pain relief such as: warm blankets, ice, television, reading, or rest.       Problem: Fluid Volume:  Goal: Signs and symptoms of dehydration will decrease  Description: Signs and symptoms of dehydration will decrease  3/23/2020 0929 by Miya Linder RN  Outcome: Ongoing  Note: IV locked, oral fluids encouraged. Monitor intake and output. Problem: Physical Regulation:  Goal: Ability to maintain vital signs within normal range will improve  Description: Ability to maintain vital signs within normal range will improve  Outcome: Ongoing  Note: Elevated blood pressure this morning. Scheduled medications given. Will continue to monitor.

## 2020-03-23 NOTE — PROGRESS NOTES
Report given to [employer] crew. Pt transferred to stretcher and taken out of facility. Belongings given to [employer].

## 2020-03-24 NOTE — CARE COORDINATION
Explained patients right to have family, representative or physician notified of their admission. Patient has Requested for physician to be notified. Patient has Declined for family/representative to be notified. Notified Dr Jaret Bond office of Pt admission.    RICCI Tolentino RN

## 2020-03-25 ENCOUNTER — HOSPITAL ENCOUNTER (OUTPATIENT)
Age: 85
Setting detail: SPECIMEN
Discharge: HOME | End: 2020-03-25
Payer: COMMERCIAL

## 2020-03-25 LAB
ALBUMIN SERPL-MCNC: 3.6 G/DL (ref 3.5–5.2)
ALBUMIN/GLOBULIN RATIO: 1.3 (ref 1–2.5)
ALP BLD-CCNC: 92 U/L (ref 35–104)
ALT SERPL-CCNC: 7 U/L (ref 5–33)
ANION GAP SERPL CALCULATED.3IONS-SCNC: 13 MMOL/L (ref 9–17)
AST SERPL-CCNC: 14 U/L
BILIRUB SERPL-MCNC: 0.26 MG/DL (ref 0.3–1.2)
BUN BLDV-MCNC: 19 MG/DL (ref 8–23)
BUN/CREAT BLD: 14 (ref 9–20)
CALCIUM SERPL-MCNC: 9 MG/DL (ref 8.6–10.4)
CHLORIDE BLD-SCNC: 103 MMOL/L (ref 98–107)
CO2: 21 MMOL/L (ref 20–31)
CREAT SERPL-MCNC: 1.33 MG/DL (ref 0.5–0.9)
GFR AFRICAN AMERICAN: 46 ML/MIN
GFR NON-AFRICAN AMERICAN: 38 ML/MIN
GFR SERPL CREATININE-BSD FRML MDRD: ABNORMAL ML/MIN/{1.73_M2}
GFR SERPL CREATININE-BSD FRML MDRD: ABNORMAL ML/MIN/{1.73_M2}
GLUCOSE BLD-MCNC: 82 MG/DL (ref 70–99)
HCT VFR BLD CALC: 37.6 % (ref 36.3–47.1)
HEMOGLOBIN: 11.8 G/DL (ref 11.9–15.1)
MCH RBC QN AUTO: 30 PG (ref 25.2–33.5)
MCHC RBC AUTO-ENTMCNC: 31.4 G/DL (ref 28.4–34.8)
MCV RBC AUTO: 95.7 FL (ref 82.6–102.9)
NRBC AUTOMATED: 0 PER 100 WBC
PDW BLD-RTO: 13 % (ref 11.8–14.4)
PLATELET # BLD: 252 K/UL (ref 138–453)
PMV BLD AUTO: 10.1 FL (ref 8.1–13.5)
POTASSIUM SERPL-SCNC: 4.3 MMOL/L (ref 3.7–5.3)
RBC # BLD: 3.93 M/UL (ref 3.95–5.11)
SODIUM BLD-SCNC: 137 MMOL/L (ref 135–144)
TOTAL PROTEIN: 6.3 G/DL (ref 6.4–8.3)
WBC # BLD: 7.4 K/UL (ref 3.5–11.3)

## 2020-03-25 PROCEDURE — P9604 ONE-WAY ALLOW PRORATED TRIP: HCPCS

## 2020-03-25 PROCEDURE — 85027 COMPLETE CBC AUTOMATED: CPT

## 2020-03-25 PROCEDURE — 36415 COLL VENOUS BLD VENIPUNCTURE: CPT

## 2020-03-25 PROCEDURE — 80053 COMPREHEN METABOLIC PANEL: CPT

## 2020-03-26 LAB
CULTURE: NORMAL
CULTURE: NORMAL
Lab: NORMAL
Lab: NORMAL
SPECIMEN DESCRIPTION: NORMAL
SPECIMEN DESCRIPTION: NORMAL

## 2020-04-22 PROBLEM — E86.0 DEHYDRATION: Status: RESOLVED | Noted: 2020-03-21 | Resolved: 2020-04-22

## 2020-04-27 ENCOUNTER — APPOINTMENT (OUTPATIENT)
Dept: GENERAL RADIOLOGY | Age: 85
DRG: 305 | End: 2020-04-27
Payer: MEDICARE

## 2020-04-27 ENCOUNTER — HOSPITAL ENCOUNTER (INPATIENT)
Age: 85
LOS: 1 days | Discharge: NURSING, REHAB OR LONG-TERM CARE | DRG: 305 | End: 2020-04-28
Attending: EMERGENCY MEDICINE | Admitting: INTERNAL MEDICINE
Payer: MEDICARE

## 2020-04-27 ENCOUNTER — APPOINTMENT (OUTPATIENT)
Dept: CT IMAGING | Age: 85
DRG: 305 | End: 2020-04-27
Payer: MEDICARE

## 2020-04-27 PROBLEM — R35.0 FREQUENCY OF MICTURITION: Status: ACTIVE | Noted: 2020-04-27

## 2020-04-27 PROBLEM — I16.1 HYPERTENSIVE EMERGENCY: Status: ACTIVE | Noted: 2020-04-27

## 2020-04-27 PROBLEM — R33.9 URINARY RETENTION: Status: ACTIVE | Noted: 2020-04-27

## 2020-04-27 LAB
-: ABNORMAL
ABSOLUTE EOS #: 0.34 K/UL (ref 0–0.44)
ABSOLUTE IMMATURE GRANULOCYTE: 0.03 K/UL (ref 0–0.3)
ABSOLUTE LYMPH #: 0.87 K/UL (ref 1.1–3.7)
ABSOLUTE MONO #: 0.88 K/UL (ref 0.1–1.2)
ALBUMIN SERPL-MCNC: 3.9 G/DL (ref 3.5–5.2)
ALBUMIN/GLOBULIN RATIO: 1.3 (ref 1–2.5)
ALP BLD-CCNC: 118 U/L (ref 35–104)
ALT SERPL-CCNC: 7 U/L (ref 5–33)
AMORPHOUS: ABNORMAL
ANION GAP SERPL CALCULATED.3IONS-SCNC: 14 MMOL/L (ref 9–17)
AST SERPL-CCNC: 11 U/L
BACTERIA: ABNORMAL
BASOPHILS # BLD: 0 % (ref 0–2)
BASOPHILS ABSOLUTE: 0.04 K/UL (ref 0–0.2)
BILIRUB SERPL-MCNC: 0.36 MG/DL (ref 0.3–1.2)
BILIRUBIN URINE: NEGATIVE
BUN BLDV-MCNC: 19 MG/DL (ref 8–23)
BUN/CREAT BLD: 12 (ref 9–20)
CALCIUM SERPL-MCNC: 9.7 MG/DL (ref 8.6–10.4)
CASTS UA: ABNORMAL /LPF
CHLORIDE BLD-SCNC: 94 MMOL/L (ref 98–107)
CO2: 23 MMOL/L (ref 20–31)
COLOR: YELLOW
COMMENT UA: ABNORMAL
CREAT SERPL-MCNC: 1.64 MG/DL (ref 0.5–0.9)
CRYSTALS, UA: ABNORMAL /HPF
DIFFERENTIAL TYPE: ABNORMAL
EKG ATRIAL RATE: 78 BPM
EKG P AXIS: 34 DEGREES
EKG P-R INTERVAL: 184 MS
EKG Q-T INTERVAL: 376 MS
EKG QRS DURATION: 110 MS
EKG QTC CALCULATION (BAZETT): 428 MS
EKG R AXIS: -24 DEGREES
EKG T AXIS: 69 DEGREES
EKG VENTRICULAR RATE: 78 BPM
EOSINOPHILS RELATIVE PERCENT: 4 % (ref 1–4)
EPITHELIAL CELLS UA: ABNORMAL /HPF (ref 0–25)
GFR AFRICAN AMERICAN: 36 ML/MIN
GFR NON-AFRICAN AMERICAN: 30 ML/MIN
GFR SERPL CREATININE-BSD FRML MDRD: ABNORMAL ML/MIN/{1.73_M2}
GFR SERPL CREATININE-BSD FRML MDRD: ABNORMAL ML/MIN/{1.73_M2}
GLUCOSE BLD-MCNC: 143 MG/DL (ref 74–100)
GLUCOSE BLD-MCNC: 144 MG/DL (ref 74–100)
GLUCOSE BLD-MCNC: 152 MG/DL (ref 70–99)
GLUCOSE BLD-MCNC: 176 MG/DL (ref 74–100)
GLUCOSE URINE: NEGATIVE
HCT VFR BLD CALC: 39.3 % (ref 36.3–47.1)
HEMOGLOBIN: 13.1 G/DL (ref 11.9–15.1)
IMMATURE GRANULOCYTES: 0 %
KETONES, URINE: NEGATIVE
LEUKOCYTE ESTERASE, URINE: NEGATIVE
LIPASE: 51 U/L (ref 13–60)
LYMPHOCYTES # BLD: 10 % (ref 24–43)
MCH RBC QN AUTO: 30.9 PG (ref 25.2–33.5)
MCHC RBC AUTO-ENTMCNC: 33.3 G/DL (ref 28.4–34.8)
MCV RBC AUTO: 92.7 FL (ref 82.6–102.9)
MONOCYTES # BLD: 10 % (ref 3–12)
MUCUS: ABNORMAL
NITRITE, URINE: NEGATIVE
NRBC AUTOMATED: 0 PER 100 WBC
OTHER OBSERVATIONS UA: ABNORMAL
PDW BLD-RTO: 13.2 % (ref 11.8–14.4)
PH UA: 7 (ref 5–9)
PLATELET # BLD: 272 K/UL (ref 138–453)
PLATELET ESTIMATE: ABNORMAL
PMV BLD AUTO: 8.8 FL (ref 8.1–13.5)
POTASSIUM SERPL-SCNC: 4.4 MMOL/L (ref 3.7–5.3)
PROTEIN UA: NEGATIVE
RBC # BLD: 4.24 M/UL (ref 3.95–5.11)
RBC # BLD: ABNORMAL 10*6/UL
RBC UA: ABNORMAL /HPF (ref 0–2)
RENAL EPITHELIAL, UA: ABNORMAL /HPF
SEG NEUTROPHILS: 76 % (ref 36–65)
SEGMENTED NEUTROPHILS ABSOLUTE COUNT: 6.83 K/UL (ref 1.5–8.1)
SODIUM BLD-SCNC: 131 MMOL/L (ref 135–144)
SPECIFIC GRAVITY UA: <1.005 (ref 1.01–1.02)
TOTAL PROTEIN: 6.8 G/DL (ref 6.4–8.3)
TRICHOMONAS: ABNORMAL
TROPONIN INTERP: ABNORMAL
TROPONIN T: ABNORMAL NG/ML
TROPONIN, HIGH SENSITIVITY: 25 NG/L (ref 0–14)
TROPONIN, HIGH SENSITIVITY: 26 NG/L (ref 0–14)
TROPONIN, HIGH SENSITIVITY: 28 NG/L (ref 0–14)
TURBIDITY: CLEAR
URINE HGB: NEGATIVE
UROBILINOGEN, URINE: NORMAL
WBC # BLD: 9 K/UL (ref 3.5–11.3)
WBC # BLD: ABNORMAL 10*3/UL
WBC UA: ABNORMAL /HPF (ref 0–5)
YEAST: ABNORMAL

## 2020-04-27 PROCEDURE — 93010 ELECTROCARDIOGRAM REPORT: CPT | Performed by: INTERNAL MEDICINE

## 2020-04-27 PROCEDURE — 99285 EMERGENCY DEPT VISIT HI MDM: CPT

## 2020-04-27 PROCEDURE — 85025 COMPLETE CBC W/AUTO DIFF WBC: CPT

## 2020-04-27 PROCEDURE — 97530 THERAPEUTIC ACTIVITIES: CPT

## 2020-04-27 PROCEDURE — 96374 THER/PROPH/DIAG INJ IV PUSH: CPT

## 2020-04-27 PROCEDURE — 93005 ELECTROCARDIOGRAM TRACING: CPT | Performed by: EMERGENCY MEDICINE

## 2020-04-27 PROCEDURE — 97166 OT EVAL MOD COMPLEX 45 MIN: CPT

## 2020-04-27 PROCEDURE — 81001 URINALYSIS AUTO W/SCOPE: CPT

## 2020-04-27 PROCEDURE — 84484 ASSAY OF TROPONIN QUANT: CPT

## 2020-04-27 PROCEDURE — 2580000003 HC RX 258: Performed by: INTERNAL MEDICINE

## 2020-04-27 PROCEDURE — 74176 CT ABD & PELVIS W/O CONTRAST: CPT

## 2020-04-27 PROCEDURE — 1200000000 HC SEMI PRIVATE

## 2020-04-27 PROCEDURE — 6360000002 HC RX W HCPCS: Performed by: EMERGENCY MEDICINE

## 2020-04-27 PROCEDURE — 83690 ASSAY OF LIPASE: CPT

## 2020-04-27 PROCEDURE — 6360000002 HC RX W HCPCS: Performed by: INTERNAL MEDICINE

## 2020-04-27 PROCEDURE — 80053 COMPREHEN METABOLIC PANEL: CPT

## 2020-04-27 PROCEDURE — 82947 ASSAY GLUCOSE BLOOD QUANT: CPT

## 2020-04-27 PROCEDURE — 51798 US URINE CAPACITY MEASURE: CPT

## 2020-04-27 PROCEDURE — 96375 TX/PRO/DX INJ NEW DRUG ADDON: CPT

## 2020-04-27 PROCEDURE — 73030 X-RAY EXAM OF SHOULDER: CPT

## 2020-04-27 PROCEDURE — 36415 COLL VENOUS BLD VENIPUNCTURE: CPT

## 2020-04-27 PROCEDURE — 6370000000 HC RX 637 (ALT 250 FOR IP): Performed by: NURSE PRACTITIONER

## 2020-04-27 PROCEDURE — 97110 THERAPEUTIC EXERCISES: CPT

## 2020-04-27 PROCEDURE — 6370000000 HC RX 637 (ALT 250 FOR IP): Performed by: INTERNAL MEDICINE

## 2020-04-27 PROCEDURE — P9612 CATHETERIZE FOR URINE SPEC: HCPCS

## 2020-04-27 PROCEDURE — 3209999900 CT LUMBAR SPINE TRAUMA RECONSTRUCTION

## 2020-04-27 PROCEDURE — 97162 PT EVAL MOD COMPLEX 30 MIN: CPT

## 2020-04-27 PROCEDURE — 71045 X-RAY EXAM CHEST 1 VIEW: CPT

## 2020-04-27 RX ORDER — SODIUM CHLORIDE 0.9 % (FLUSH) 0.9 %
10 SYRINGE (ML) INJECTION PRN
Status: DISCONTINUED | OUTPATIENT
Start: 2020-04-27 | End: 2020-04-28 | Stop reason: HOSPADM

## 2020-04-27 RX ORDER — LABETALOL HYDROCHLORIDE 5 MG/ML
20 INJECTION, SOLUTION INTRAVENOUS EVERY 10 MIN PRN
Status: DISCONTINUED | OUTPATIENT
Start: 2020-04-27 | End: 2020-04-28 | Stop reason: HOSPADM

## 2020-04-27 RX ORDER — PROMETHAZINE HYDROCHLORIDE 25 MG/1
12.5 TABLET ORAL EVERY 6 HOURS PRN
Status: DISCONTINUED | OUTPATIENT
Start: 2020-04-27 | End: 2020-04-28 | Stop reason: HOSPADM

## 2020-04-27 RX ORDER — ASPIRIN 81 MG/1
81 TABLET, CHEWABLE ORAL DAILY
Status: DISCONTINUED | OUTPATIENT
Start: 2020-04-27 | End: 2020-04-28 | Stop reason: HOSPADM

## 2020-04-27 RX ORDER — ESOMEPRAZOLE MAGNESIUM 40 MG/1
40 FOR SUSPENSION ORAL DAILY
Status: DISCONTINUED | OUTPATIENT
Start: 2020-04-27 | End: 2020-04-27 | Stop reason: CLARIF

## 2020-04-27 RX ORDER — ONDANSETRON 2 MG/ML
4 INJECTION INTRAMUSCULAR; INTRAVENOUS EVERY 6 HOURS PRN
Status: DISCONTINUED | OUTPATIENT
Start: 2020-04-27 | End: 2020-04-28 | Stop reason: HOSPADM

## 2020-04-27 RX ORDER — FAMOTIDINE 20 MG/1
20 TABLET, FILM COATED ORAL DAILY
Status: DISCONTINUED | OUTPATIENT
Start: 2020-04-27 | End: 2020-04-27

## 2020-04-27 RX ORDER — ACETAMINOPHEN 325 MG/1
650 TABLET ORAL EVERY 6 HOURS PRN
Status: DISCONTINUED | OUTPATIENT
Start: 2020-04-27 | End: 2020-04-28 | Stop reason: HOSPADM

## 2020-04-27 RX ORDER — KETOROLAC TROMETHAMINE 15 MG/ML
15 INJECTION, SOLUTION INTRAMUSCULAR; INTRAVENOUS ONCE
Status: COMPLETED | OUTPATIENT
Start: 2020-04-27 | End: 2020-04-27

## 2020-04-27 RX ORDER — PANTOPRAZOLE SODIUM 40 MG/1
40 TABLET, DELAYED RELEASE ORAL
Status: DISCONTINUED | OUTPATIENT
Start: 2020-04-27 | End: 2020-04-28 | Stop reason: HOSPADM

## 2020-04-27 RX ORDER — SODIUM CHLORIDE 9 MG/ML
INJECTION, SOLUTION INTRAVENOUS CONTINUOUS
Status: ACTIVE | OUTPATIENT
Start: 2020-04-27 | End: 2020-04-27

## 2020-04-27 RX ORDER — NAPROXEN SODIUM 220 MG
220 TABLET ORAL EVERY 4 HOURS PRN
Status: ON HOLD | COMMUNITY
End: 2020-04-28 | Stop reason: HOSPADM

## 2020-04-27 RX ORDER — AMLODIPINE BESYLATE 10 MG/1
10 TABLET ORAL DAILY
Status: DISCONTINUED | OUTPATIENT
Start: 2020-04-27 | End: 2020-04-28 | Stop reason: HOSPADM

## 2020-04-27 RX ORDER — ACETAMINOPHEN 325 MG/1
650 TABLET ORAL EVERY 6 HOURS PRN
Status: ON HOLD | COMMUNITY
End: 2020-05-26

## 2020-04-27 RX ORDER — ONDANSETRON 2 MG/ML
4 INJECTION INTRAMUSCULAR; INTRAVENOUS ONCE
Status: COMPLETED | OUTPATIENT
Start: 2020-04-27 | End: 2020-04-27

## 2020-04-27 RX ORDER — ACETAMINOPHEN 650 MG/1
650 SUPPOSITORY RECTAL EVERY 6 HOURS PRN
Status: DISCONTINUED | OUTPATIENT
Start: 2020-04-27 | End: 2020-04-28 | Stop reason: HOSPADM

## 2020-04-27 RX ORDER — LANSOPRAZOLE
30 KIT DAILY
Status: DISCONTINUED | OUTPATIENT
Start: 2020-04-27 | End: 2020-04-27

## 2020-04-27 RX ORDER — SODIUM CHLORIDE 0.9 % (FLUSH) 0.9 %
10 SYRINGE (ML) INJECTION EVERY 12 HOURS SCHEDULED
Status: DISCONTINUED | OUTPATIENT
Start: 2020-04-27 | End: 2020-04-28 | Stop reason: HOSPADM

## 2020-04-27 RX ADMIN — METOPROLOL TARTRATE 75 MG: 50 TABLET, FILM COATED ORAL at 10:02

## 2020-04-27 RX ADMIN — ONDANSETRON 4 MG: 2 INJECTION INTRAMUSCULAR; INTRAVENOUS at 07:21

## 2020-04-27 RX ADMIN — Medication 10 ML: at 20:19

## 2020-04-27 RX ADMIN — PANTOPRAZOLE SODIUM 40 MG: 40 TABLET, DELAYED RELEASE ORAL at 10:05

## 2020-04-27 RX ADMIN — ACETAMINOPHEN 650 MG: 325 TABLET, FILM COATED ORAL at 20:20

## 2020-04-27 RX ADMIN — SODIUM CHLORIDE: 9 INJECTION, SOLUTION INTRAVENOUS at 09:15

## 2020-04-27 RX ADMIN — Medication 10 ML: at 09:15

## 2020-04-27 RX ADMIN — ASPIRIN 81 MG 81 MG: 81 TABLET ORAL at 10:02

## 2020-04-27 RX ADMIN — AMLODIPINE BESYLATE 10 MG: 10 TABLET ORAL at 10:02

## 2020-04-27 RX ADMIN — ENOXAPARIN SODIUM 30 MG: 30 INJECTION SUBCUTANEOUS at 10:02

## 2020-04-27 RX ADMIN — KETOROLAC TROMETHAMINE 15 MG: 15 INJECTION, SOLUTION INTRAMUSCULAR; INTRAVENOUS at 08:16

## 2020-04-27 RX ADMIN — METOPROLOL TARTRATE 75 MG: 50 TABLET, FILM COATED ORAL at 20:19

## 2020-04-27 RX ADMIN — METFORMIN HYDROCHLORIDE 1000 MG: 500 TABLET ORAL at 10:02

## 2020-04-27 ASSESSMENT — PAIN SCALES - GENERAL
PAINLEVEL_OUTOF10: 3
PAINLEVEL_OUTOF10: 0
PAINLEVEL_OUTOF10: 5
PAINLEVEL_OUTOF10: 7
PAINLEVEL_OUTOF10: 5
PAINLEVEL_OUTOF10: 5

## 2020-04-27 ASSESSMENT — PAIN DESCRIPTION - LOCATION
LOCATION: HIP
LOCATION: ARM
LOCATION: ARM

## 2020-04-27 ASSESSMENT — ENCOUNTER SYMPTOMS
SHORTNESS OF BREATH: 0
WHEEZING: 0
ABDOMINAL PAIN: 1
VOMITING: 0
NAUSEA: 0
BACK PAIN: 0
COLOR CHANGE: 0
DIARRHEA: 0

## 2020-04-27 ASSESSMENT — PAIN DESCRIPTION - PAIN TYPE
TYPE: ACUTE PAIN
TYPE: ACUTE PAIN

## 2020-04-27 ASSESSMENT — PAIN DESCRIPTION - ORIENTATION
ORIENTATION: RIGHT

## 2020-04-27 NOTE — PROGRESS NOTES
Physical Therapy  Facility/Department: [facility] AT THE [facility] MED SURG  Daily Treatment Note  NAME: Altagracia Goodwin  : 1935  MRN: [id number]    Date of Service: 2020    Discharge Recommendations:  Continue to assess pending progress        Assessment   Treatment Diagnosis: left sided weakness; difficulty walking  Prognosis: Fair  PT Education: Transfer Training;Goals  REQUIRES PT FOLLOW UP: Yes  Activity Tolerance  Activity Tolerance: Patient Tolerated treatment well     Patient Diagnosis(es): The primary encounter diagnosis was Right arm pain. Diagnoses of Abdominal pain, unspecified abdominal location, Hypertensive emergency, and Unable to ambulate were also pertinent to this visit. has a past medical history of Arthritis, CAD (coronary artery disease), Clinical trial participant at discharge, Diabetes mellitus ([facility] [address] GERD (gastroesophageal reflux disease), H/O cardiovascular stress test, Hyperlipidemia, Hypertension, NSTEMI (non-ST elevated myocardial infarction) ([facility] [address] and Unspecified cerebral artery occlusion with cerebral infarction. has a past surgical history that includes Leg Surgery (Bilateral); Cholecystectomy; Colonoscopy; eye surgery; Tonsillectomy; Toe Surgery (Right, 8/21/15); Cardiac catheterization (2018); and Coronary angioplasty with stent (). Restrictions  Restrictions/Precautions  Restrictions/Precautions: Fall Risk, General Precautions  Subjective   General  Chart Reviewed: Yes  Response To Previous Treatment: Patient with no complaints from previous session. Family / Caregiver Present: No          Orientation  Orientation  Overall Orientation Status: Within Functional Limits  Cognition      Objective   Bed mobility  Rolling to Right: Moderate assistance;Minimal assistance  Sit to Supine: Moderate assistance;Maximum assistance  Scooting: Maximal assistance; Moderate assistance  Comment: Patient with c/o of increased right side/leg pain (chronic), with positional

## 2020-04-27 NOTE — ED PROVIDER NOTES
[facility] ED  Emergency Department Encounter  EmergencyMedicine Attending     Pt Spring Rojas  MRN: [id number]  Birthdate 1935  Date of evaluation: 4/27/20  PCP:  Tuan Guerra MD    [address] [place] [address]       Chief Complaint   Patient presents with    Polyuria     states ongoing one week    Arm Pain     right upper arm, denies injury or fall       HISTORY OF PRESENT ILLNESS  (Location/Symptom, Timing/Onset, Context/Setting, Quality, Duration, Modifying Factors, Severity.)      Zen Rodgers is a 81 y.o. female who presents with right-sided arm pain, denies any falls or injury, however says that she has been trying to pull herself out of bed and since then the arm has been hurting. But no falls or traumatic injury. Patient also reports suprapubic and left lower quadrant abdominal pain, ongoing for the last 3 or 4 days, pain is 7 out of 10, right arm as well as the suprapubic area. Does also report some frequency and polyuria, denies any urgency, denies any dysuria. No fevers or chills. No falls or injury. Chronic back pain, denies any significant changes in there. Patient lives with her 61-year-old  and her Mentally disabled son. Patient is concerned that she will not be able to care for herself as she has been completely unable to ambulate. She was just recently discharged from a nursing home facility. Denies any chest pain associated with arm pain. Denies any shortness of breath. PAST MEDICAL / SURGICAL / SOCIAL / FAMILY HISTORY      has a past medical history of Arthritis, CAD (coronary artery disease), Clinical trial participant at discharge, Diabetes mellitus ([address] GERD (gastroesophageal reflux disease), H/O cardiovascular stress test, Hyperlipidemia, Hypertension, NSTEMI (non-ST elevated myocardial infarction) ([address] and Unspecified cerebral artery occlusion with cerebral infarction.      has a past surgical history that includes Leg Surgery (Bilateral); reassess. RADIOLOGY:  None    EKG    EKG Interpretation    Interpreted by me    Rhythm: normal sinus   Rate: normal 78  Axis: normal  Ectopy: none  Conduction: non specific depression I, AVL, minimal ST elevation in III, does not meet STEMI criteria though. These depressions are unchanged from previous EKG from 3/22/20  ST Segments: no acute change  T Waves: no acute change  Q Waves: III, AVF    Clinical Impression: Non specific EKG. Non specific ST segment changes, but these are similar to previous EKG from 3/22/20    All EKG's are interpreted by the Emergency Department Physician who either signs or Co-signs this chart in the absence of a cardiologist.    EMERGENCY DEPARTMENT COURSE:    Pending blood work and imaging results. Signed off to Dr. Rosalina Savage who neelima follow up on results from CT imaging, urine, and blood work and do disposition decision accordingly. Potential admission due to inability to ambulate and not having significant assistance at home. Pending workup otherwise. PROCEDURES:  None    CONSULTS:  None    CRITICAL CARE:  None    FINAL IMPRESSION      1. Right arm pain    2. Abdominal pain, unspecified abdominal location          DISPOSITION / PLAN     DISPOSITION  Signed off      PATIENT REFERRED TO:  No follow-up provider specified.     DISCHARGE MEDICATIONS:  New Prescriptions    No medications on file       Demetrius Quiros MD  Emergency Medicine Attending    (Please note that portions of thisnote were completed with a voice recognition program.  Efforts were made to edit the dictations but occasionally words are mis-transcribed.)       Demetrius Quiros MD  04/27/20 Antonio Colindres MD  04/27/20 2769

## 2020-04-27 NOTE — CONSULTS
normal  Scrotal exam normal  Testicles normal bilaterally  Epididymis normal bilaterally  No evidence of inguinal hernia    LABS:  Recent Labs     04/27/20  0652   WBC 9.0   HGB 13.1   HCT 39.3   MCV 92.7        Recent Labs     04/27/20  0652   *   K 4.4   CL 94*   CO2 23   BUN 19   CREATININE 1.64*     No results found for: PSA    Additional Lab/culture results:    Urinalysis:   Recent Labs     04/27/20  0716   COLORU YELLOW   PHUR 7.0   WBCUA 0 TO 2   RBCUA None   MUCUS NOT REPORTED   TRICHOMONAS NOT REPORTED   YEAST NOT REPORTED   BACTERIA NOT REPORTED   SPECGRAV <1.005*   LEUKOCYTESUR NEGATIVE   UROBILINOGEN Normal   BILIRUBINUR NEGATIVE        -----------------------------------------------------------------  Imaging Results:    Assessment and Plan   Impression:    Patient Active Problem List   Diagnosis    Left hip pain    Acute coronary syndrome ([address]    S/P drug eluting coronary stent placement - LAD 3/26/18 - Dr. Lathan Aase    Dehydration with hyponatremia    Type 2 diabetes mellitus, with long-term current use of insulin ([address]    Hypertensive emergency    Frequency of micturition    Urinary retention       Plan: Craft catheter being placed. I would attempt to remove this prior to discharge. We will see the patient as an outpatient. She will need cystoscopy and pelvic examination.     Maria M Govea  12:10 PM 4/27/2020

## 2020-04-27 NOTE — PROGRESS NOTES
Met with Patient this p.m. to discuss discharge planning. Patient is an 81year old , white female, admitted with diagnosis of Hypertensive Emergency. Patient is alert and oriented, pleasant and cooperative with this assessment, has recently been at [facility] following a hospitalization. Patient states that she \"left to soon\" and wishes to return there for rehabilitation with an eventual goal to go home if she is able. Patient [place] at [place] in [place] with her  and her adult/disabled son. She has a walker and wheelchair to use at home as needed.  has been caring for Patient at home and provides for her transportation needs. PCP is Dr. Mario Villa. Patient has insurance and she denies needing financial assistance to pay for her medications. Discharge plan is to go to [facility] for rehab after this hospitalization. Referral made to [facility] for their review. Patient remains a 'Full Code' at this time. She is noted to have a Living Will in place but it is not on file.  notified of above. LSW to assist with discharge placement details and all other needs as they arise.     [address]  4/27/2020

## 2020-04-27 NOTE — PROGRESS NOTES
Occupational Therapy   Occupational Therapy Initial Assessment  Date: 2020   Patient Name: Lan Aguilar  MRN: [id number]     : 1935    Date of Service: 2020    Discharge Recommendations:  Continue to assess pending progress, Home with Home health OT, Patient would benefit from continued therapy after discharge       Assessment   Performance deficits / Impairments: Decreased balance;Decreased functional mobility ; Decreased ADL status; Decreased endurance;Decreased strength  Assessment: Patient is a 82 y/o female admitted to hospital due to hypertensive emergency and now requires increased assistance with ADLs and functional mobility secondary to exacerbation of decreased balance, endurance and strength. Patient would benefit from skilled OT services to maximize functional return, safety and overall QOL. Prognosis: Fair  Decision Making: Medium Complexity  OT Education: OT Role;Transfer Training;Plan of Care;Energy Conservation;Precautions  Barriers to Learning: None  REQUIRES OT FOLLOW UP: Yes  Activity Tolerance  Activity Tolerance: Patient limited by pain; Patient limited by fatigue  Safety Devices  Safety Devices in place: Yes  Type of devices: All fall risk precautions in place;Gait belt;Call light within reach; Patient at risk for falls; Left in chair;Chair alarm in place           Patient Diagnosis(es): The primary encounter diagnosis was Right arm pain. Diagnoses of Abdominal pain, unspecified abdominal location, Hypertensive emergency, and Unable to ambulate were also pertinent to this visit. has a past medical history of Arthritis, CAD (coronary artery disease), Clinical trial participant at discharge, Diabetes mellitus ([address] GERD (gastroesophageal reflux disease), H/O cardiovascular stress test, Hyperlipidemia, Hypertension, NSTEMI (non-ST elevated myocardial infarction) ([address] and Unspecified cerebral artery occlusion with cerebral infarction.    has a past surgical history that includes Leg Surgery (Bilateral); Cholecystectomy; Colonoscopy; eye surgery; Tonsillectomy; Toe Surgery (Right, 8/21/15); Cardiac catheterization (03/26/2018); and Coronary angioplasty with stent (2002). Restrictions  Restrictions/Precautions  Restrictions/Precautions: Fall Risk, General Precautions    Subjective   General  Chart Reviewed: Yes  Patient assessed for rehabilitation services?: Yes  Family / Caregiver Present: No  Referring Practitioner: Dr Yehuda Daniel  Diagnosis: Hypertensive emergency  Subjective  Subjective: Patient denies pain at rest but reports chronic back pain with transitional movements  General Comment  Comments: Patient laying in bed upon OT arrival, agreeable to OT evaluation       Social/Functional History  Social/Functional History  Lives With: Spouse  Type of Home: House  Home Layout: Able to Live on Main level with bedroom/bathroom, Multi-level, Performs ADL's on one level  Home Access: Ramped entrance  Bathroom Shower/Tub: Tub/Shower unit  Bathroom Toilet: Handicap height  Bathroom Accessibility: Wheelchair accessible  Home Equipment: [address] Help From: Family  ADL Assistance: Needs assistance  Homemaking Assistance: Needs assistance  Homemaking Responsibilities: No  Ambulation Assistance: Needs assistance  Transfer Assistance: Needs assistance  Active : No  Additional Comments: Patient is non-ambulatory and performs stand pivot transfers only. Patient requires (A) with ADLs from her  who is her primary caregiver.          Objective   Vision: Impaired  Vision Exceptions: Wears glasses at all times  Hearing: Within functional limits    Orientation  Overall Orientation Status: Within Functional Limits     Balance  Sitting Balance: Stand by assistance  Standing Balance: Maximum assistance  Standing Balance  Time: Stand pivot transfer from EOB to bedside chair  Activity: < 30 sec  Comment: No LOB noted with Poor balance demo'd  Functional

## 2020-04-27 NOTE — ED NOTES
Dr Gilford Savant called at office. He is not there yet.  They will leave a note and he will call us back     Ivana Cameron  04/27/20 9517

## 2020-04-27 NOTE — PLAN OF CARE
Problem: Falls - Risk of:  Goal: Will remain free from falls  Description: Will remain free from falls  Outcome: Ongoing  Note: Patient at high risk for falls. Fall precautions in place. Non skid slipper socks on, bed in lowest position with wheels locked and siderails up x2, bed alarm on. Patient is being monitored on a regular basis . Call light within reach       Problem: Urinary Retention:  Goal: Urinary elimination within specified parameters  Description: Urinary elimination within specified parameters  Outcome: Ongoing  Note: PVR performed, patient retaining. Urology consulted and will place indwelling catheter.    Goal: Ability to recognize the need to void and respond appropriately will improve  Description: Ability to recognize the need to void and respond appropriately will improve  Outcome: Ongoing  Goal: Absence of postvoid residual urine  Description: Absence of postvoid residual urine  Outcome: Ongoing

## 2020-04-27 NOTE — PROGRESS NOTES
Physical Therapy    Facility/Department: [facility] AT THE [facility] MED SURG  Initial Assessment    NAME: Sara Comer  : 1935  MRN: [id number]    Date of Service: 2020    Discharge Recommendations:  Continue to assess pending progress      Assessment   Assessment: Pt is 80 y/o female with chonic left sided weakness and difficulty performing functional mobility. Pt requires mod to max A with bed mobility and stand pivot transfers. Will benefit from PT to address deficits. Treatment Diagnosis: left sided weakness; difficulty walking  Prognosis: Fair  Decision Making: Medium Complexity  Patient Education: PT eval and POC  REQUIRES PT FOLLOW UP: Yes  Activity Tolerance  Activity Tolerance: Patient Tolerated treatment well       Patient Diagnosis(es): The primary encounter diagnosis was Right arm pain. Diagnoses of Abdominal pain, unspecified abdominal location, Hypertensive emergency, and Unable to ambulate were also pertinent to this visit. has a past medical history of Arthritis, CAD (coronary artery disease), Clinical trial participant at discharge, Diabetes mellitus [address] [address] GERD (gastroesophageal reflux disease), H/O cardiovascular stress test, Hyperlipidemia, Hypertension, NSTEMI (non-ST elevated myocardial infarction) ([facility] [address] and Unspecified cerebral artery occlusion with cerebral infarction. has a past surgical history that includes Leg Surgery (Bilateral); Cholecystectomy; Colonoscopy; eye surgery; Tonsillectomy; Toe Surgery (Right, 8/21/15); Cardiac catheterization (2018); and Coronary angioplasty with stent ().     Restrictions  Restrictions/Precautions  Restrictions/Precautions: Fall Risk, General Precautions     Vision/Hearing  Vision Exceptions: Wears glasses at all times  Hearing: Within functional limits       Subjective  General  Chart Reviewed: Yes  Patient assessed for rehabilitation services?: Yes  Response To Previous Treatment: Not applicable  Family / Caregiver Present: No  Follows Exercise Program, Safety Education & Training, Transfer Training  Safety Devices  Type of devices: All fall risk precautions in place      AM-PAC Score  AM-PAC Inpatient Mobility without Stair Climbing Raw Score : 9 (04/27/20 1220)  AM-PAC Inpatient without Stair Climbing T-Scale Score : 32.44 (04/27/20 1220)  Mobility Inpatient CMS 0-100% Score: 76.07 (04/27/20 1220)  Mobility Inpatient without Stair CMS G-Code Modifier : CL (04/27/20 1220)       Goals  Short term goals  Time Frame for Short term goals: 10 days  Short term goal 1: Pt to perform bed mobility with min A to decrease caregiver strain. Short term goal 2: Pt to tolerate 20-30 mins ther ex/act for improved strength and endurance with ADL's. Short term goal 3: Pt to perform stand pivot with min A to decrease caregiver strain. Short term goal 4: Pt to demonstrate  good/fair static standing balance for decrease fall risk.    Patient Goals   Patient goals : home following discharge       Therapy Time   Individual Concurrent Group Co-treatment   Time In 1119         Time Out 1145         Minutes 26                 Radha Li, PT, DPT, CMPT

## 2020-04-27 NOTE — H&P
TAB/Lila Juan    TONSILLECTOMY         Family History:   History reviewed. No pertinent family history. Social History:   TOBACCO:   reports that she has never smoked. She has never used smokeless tobacco.  ETOH:   reports current alcohol use. ELICIT DRUG USE:    Social History     Substance and Sexual Activity   Drug Use Not on file     OCCUPATION: Retired      Allergies:  Patient has no known allergies. Medications Prior to Admission:    Prior to Admission medications    Medication Sig Start Date End Date Taking? Authorizing Provider   naproxen sodium (ALEVE) 220 MG tablet Take 220 mg by mouth every 4 hours as needed for Pain    Yes Historical Provider, MD   acetaminophen (TYLENOL) 325 MG tablet Take 650 mg by mouth every 6 hours as needed for Pain   Yes Historical Provider, MD   METOPROLOL TARTRATE PO Take 75 mg by mouth 2 times daily   Yes Historical Provider, MD   ESOMEPRAZOLE MAGNESIUM PO Take 40 mg by mouth daily    Yes Historical Provider, MD   glipiZIDE (GLUCOTROL) 5 MG tablet Take 5 mg by mouth 2 times daily (before meals) Extended release   Yes Historical Provider, MD   aspirin 81 MG tablet Take 81 mg by mouth daily   Yes Historical Provider, MD   metFORMIN (GLUCOPHAGE) 1000 MG tablet Take 1,000 mg by mouth daily (with breakfast)   Yes Historical Provider, MD   amLODIPine (NORVASC) 10 MG tablet Take 10 mg by mouth daily     Historical Provider, MD       Review of Systems:  Constitutional:negative  for fevers, and negative for chills.   Eyes: positive for visual disturbance in the morning   ENT: negative for sore throat, negative nasal congestion, and negative for earache  Respiratory: negative for shortness of breath, negative for cough, and negative for wheezing  Cardiovascular: negative for chest pain, negative for palpitations, and negative for syncope  Gastrointestinal: positive for abdominal pain, negative for nausea,negative for vomiting, negative for diarrhea, negative for constipation,

## 2020-04-28 VITALS
OXYGEN SATURATION: 95 % | SYSTOLIC BLOOD PRESSURE: 153 MMHG | TEMPERATURE: 97.7 F | RESPIRATION RATE: 18 BRPM | BODY MASS INDEX: 26.23 KG/M2 | DIASTOLIC BLOOD PRESSURE: 71 MMHG | HEART RATE: 59 BPM | WEIGHT: 157.44 LBS | HEIGHT: 65 IN

## 2020-04-28 PROBLEM — E44.1 MILD MALNUTRITION (HCC): Status: ACTIVE | Noted: 2020-04-28

## 2020-04-28 LAB
ABSOLUTE EOS #: 0.35 K/UL (ref 0–0.44)
ABSOLUTE IMMATURE GRANULOCYTE: <0.03 K/UL (ref 0–0.3)
ABSOLUTE LYMPH #: 1.23 K/UL (ref 1.1–3.7)
ABSOLUTE MONO #: 0.84 K/UL (ref 0.1–1.2)
ANION GAP SERPL CALCULATED.3IONS-SCNC: 8 MMOL/L (ref 9–17)
BASOPHILS # BLD: 0 % (ref 0–2)
BASOPHILS ABSOLUTE: 0.03 K/UL (ref 0–0.2)
BUN BLDV-MCNC: 18 MG/DL (ref 8–23)
BUN/CREAT BLD: 12 (ref 9–20)
CALCIUM SERPL-MCNC: 8.2 MG/DL (ref 8.6–10.4)
CHLORIDE BLD-SCNC: 99 MMOL/L (ref 98–107)
CO2: 21 MMOL/L (ref 20–31)
CREAT SERPL-MCNC: 1.52 MG/DL (ref 0.5–0.9)
DIFFERENTIAL TYPE: ABNORMAL
EOSINOPHILS RELATIVE PERCENT: 4 % (ref 1–4)
GFR AFRICAN AMERICAN: 40 ML/MIN
GFR NON-AFRICAN AMERICAN: 33 ML/MIN
GFR SERPL CREATININE-BSD FRML MDRD: ABNORMAL ML/MIN/{1.73_M2}
GFR SERPL CREATININE-BSD FRML MDRD: ABNORMAL ML/MIN/{1.73_M2}
GLUCOSE BLD-MCNC: 126 MG/DL (ref 70–99)
HCT VFR BLD CALC: 32.6 % (ref 36.3–47.1)
HEMOGLOBIN: 10.5 G/DL (ref 11.9–15.1)
IMMATURE GRANULOCYTES: 0 %
LYMPHOCYTES # BLD: 14 % (ref 24–43)
MCH RBC QN AUTO: 30.6 PG (ref 25.2–33.5)
MCHC RBC AUTO-ENTMCNC: 32.2 G/DL (ref 28.4–34.8)
MCV RBC AUTO: 95 FL (ref 82.6–102.9)
MONOCYTES # BLD: 10 % (ref 3–12)
NRBC AUTOMATED: 0 PER 100 WBC
PDW BLD-RTO: 13.4 % (ref 11.8–14.4)
PLATELET # BLD: 224 K/UL (ref 138–453)
PLATELET ESTIMATE: ABNORMAL
PMV BLD AUTO: 9.1 FL (ref 8.1–13.5)
POTASSIUM SERPL-SCNC: 4.5 MMOL/L (ref 3.7–5.3)
RBC # BLD: 3.43 M/UL (ref 3.95–5.11)
RBC # BLD: ABNORMAL 10*6/UL
SEG NEUTROPHILS: 72 % (ref 36–65)
SEGMENTED NEUTROPHILS ABSOLUTE COUNT: 6.13 K/UL (ref 1.5–8.1)
SODIUM BLD-SCNC: 128 MMOL/L (ref 135–144)
WBC # BLD: 8.6 K/UL (ref 3.5–11.3)
WBC # BLD: ABNORMAL 10*3/UL

## 2020-04-28 PROCEDURE — 2580000003 HC RX 258: Performed by: INTERNAL MEDICINE

## 2020-04-28 PROCEDURE — 51798 US URINE CAPACITY MEASURE: CPT

## 2020-04-28 PROCEDURE — 6370000000 HC RX 637 (ALT 250 FOR IP): Performed by: INTERNAL MEDICINE

## 2020-04-28 PROCEDURE — 6370000000 HC RX 637 (ALT 250 FOR IP): Performed by: NURSE PRACTITIONER

## 2020-04-28 PROCEDURE — 97530 THERAPEUTIC ACTIVITIES: CPT

## 2020-04-28 PROCEDURE — 97535 SELF CARE MNGMENT TRAINING: CPT

## 2020-04-28 PROCEDURE — 80048 BASIC METABOLIC PNL TOTAL CA: CPT

## 2020-04-28 PROCEDURE — 36415 COLL VENOUS BLD VENIPUNCTURE: CPT

## 2020-04-28 PROCEDURE — 97110 THERAPEUTIC EXERCISES: CPT

## 2020-04-28 PROCEDURE — 85025 COMPLETE CBC W/AUTO DIFF WBC: CPT

## 2020-04-28 PROCEDURE — 6360000002 HC RX W HCPCS: Performed by: INTERNAL MEDICINE

## 2020-04-28 RX ADMIN — ACETAMINOPHEN 650 MG: 325 TABLET, FILM COATED ORAL at 06:56

## 2020-04-28 RX ADMIN — ACETAMINOPHEN 650 MG: 325 TABLET, FILM COATED ORAL at 11:41

## 2020-04-28 RX ADMIN — ASPIRIN 81 MG 81 MG: 81 TABLET ORAL at 08:59

## 2020-04-28 RX ADMIN — ENOXAPARIN SODIUM 30 MG: 30 INJECTION SUBCUTANEOUS at 08:59

## 2020-04-28 RX ADMIN — METFORMIN HYDROCHLORIDE 1000 MG: 500 TABLET ORAL at 08:59

## 2020-04-28 RX ADMIN — Medication 10 ML: at 10:14

## 2020-04-28 RX ADMIN — PANTOPRAZOLE SODIUM 40 MG: 40 TABLET, DELAYED RELEASE ORAL at 06:56

## 2020-04-28 RX ADMIN — METOPROLOL TARTRATE 75 MG: 50 TABLET, FILM COATED ORAL at 08:58

## 2020-04-28 RX ADMIN — AMLODIPINE BESYLATE 10 MG: 10 TABLET ORAL at 08:59

## 2020-04-28 ASSESSMENT — PAIN DESCRIPTION - PAIN TYPE
TYPE: CHRONIC PAIN
TYPE: ACUTE PAIN
TYPE: CHRONIC PAIN

## 2020-04-28 ASSESSMENT — PAIN DESCRIPTION - LOCATION
LOCATION: HIP

## 2020-04-28 ASSESSMENT — PAIN SCALES - GENERAL
PAINLEVEL_OUTOF10: 10
PAINLEVEL_OUTOF10: 4
PAINLEVEL_OUTOF10: 4
PAINLEVEL_OUTOF10: 6
PAINLEVEL_OUTOF10: 7

## 2020-04-28 ASSESSMENT — PAIN DESCRIPTION - ORIENTATION
ORIENTATION: RIGHT

## 2020-04-28 ASSESSMENT — PAIN DESCRIPTION - FREQUENCY: FREQUENCY: INTERMITTENT

## 2020-04-28 ASSESSMENT — PAIN DESCRIPTION - DESCRIPTORS: DESCRIPTORS: ACHING

## 2020-04-28 NOTE — DISCHARGE SUMMARY
03/24/2018 HISTORY: ORDERING SYSTEM PROVIDED HISTORY: Chest pain TECHNOLOGIST PROVIDED HISTORY: Chest pain FINDINGS: The cardiac silhouette appears within normal limits for size given portable technique. No convincing evidence of a focal consolidation. Chronic interstitial prominence. No pleural effusion or pneumothorax seen. No acute cardiopulmonary abnormality. Ct Lumbar Spine Trauma Reconstruction    Result Date: 4/27/2020  EXAMINATION: CT OF THE LUMBAR SPINE WITHOUT CONTRAST  4/27/2020 TECHNIQUE: CT of the lumbar spine was performed without the administration of intravenous contrast. Multiplanar reformatted images are provided for review. Dose modulation, iterative reconstruction, and/or weight based adjustment of the mA/kV was utilized to reduce the radiation dose to as low as reasonably achievable. COMPARISON: 03/17/2020 HISTORY: ORDERING SYSTEM PROVIDED HISTORY: Back pain TECHNOLOGIST PROVIDED HISTORY: Back pain FINDINGS: BONES/ALIGNMENT: There is normal alignment of the spine. Again seen is superior endplate infraction L4 and mild anterior wedge compression deformity T11. No osseous destructive lesion is seen. DEGENERATIVE CHANGES: Multilevel vacuum disc and facet arthrosis. SOFT TISSUES/RETROPERITONEUM: No paraspinal mass is seen. No acute findings.        Assessment and Plan:  Patient Active Problem List    Diagnosis Date Noted    S/P drug eluting coronary stent placement - LAD 3/26/18 - Dr. Lotus Cotton 03/26/2018     Priority: High    Mild malnutrition ([address] 04/28/2020     Priority: Medium     Class: Present on Admission    Hypertensive emergency 04/27/2020    Frequency of micturition 04/27/2020    Urinary retention 04/27/2020    Type 2 diabetes mellitus, with long-term current use of insulin ([address] 03/23/2020    Dehydration with hyponatremia 03/21/2020    Acute coronary syndrome ([address] 03/24/2018    Left hip pain 03/20/2018        Discharge Medications:       Reita Paul Medication Instructions EQE:[id number]    Printed on:04/28/20 1111   Medication Information                      acetaminophen (TYLENOL) 325 MG tablet  Take 650 mg by mouth every 6 hours as needed for Pain             amLODIPine (NORVASC) 10 MG tablet  Take 10 mg by mouth daily              aspirin 81 MG tablet  Take 81 mg by mouth daily             ESOMEPRAZOLE MAGNESIUM PO  Take 40 mg by mouth daily              glipiZIDE (GLUCOTROL) 5 MG tablet  Take 5 mg by mouth 2 times daily (before meals) Extended release             metFORMIN (GLUCOPHAGE) 1000 MG tablet  Take 1,000 mg by mouth daily (with breakfast)             METOPROLOL TARTRATE PO  Take 75 mg by mouth 2 times daily                 Patient Instructions:    Activity: activity as tolerated  Diet: cardiac diet  Wound Care: none needed  Other: None     Disposition:   DC to [facility] E[facility]    Follow up:  Patient will be followed by Edwin Carroll MD in 1-2 weeks    CORE MEASURES on Discharge (if applicable)  ACE/ARB in CHF: NA  Statin in MI: NA  ASA in MI: NA  Statin in CVA: NA  Antiplatelet in CVA: NA    Total time spent on discharge services: 25 minutes    Including the following activities:  Evaluation and Management of patient  Discussion with patient and/or surrogate about current care plan  Coordination with Case Management and/or   Coordination of care with Consultants (if applicable)   Coordination of care with Receiving Facility Physician (if applicable)  Completion of DME forms (if applicable)  Preparation of Discharge Summary  Preparation of Medication Reconciliation  Preparation of Discharge Prescriptions    Signed:  JEFFREY Zimmerman, NP-C  4/28/2020, 11:11 AM

## 2020-04-28 NOTE — PROGRESS NOTES
Occupational Therapy  Facility/Department: [facility] AT THE [facility] MED SURG  Daily Treatment Note  NAME: Victor Hugo Bryant  : 1935  MRN: [id number]    Date of Service: 2020    Discharge Recommendations:  Continue to assess pending progress, Home with Home health OT, Patient would benefit from continued therapy after discharge       Assessment      OT Education: OT Role;Transfer Training;Plan of Care;Energy Conservation  Barriers to Learning: None  Activity Tolerance  Activity Tolerance: Patient limited by pain; Patient limited by fatigue  Safety Devices  Safety Devices in place: Yes  Type of devices: All fall risk precautions in place;Gait belt;Call light within reach; Patient at risk for falls; Left in chair;Chair alarm in place         Patient Diagnosis(es): The primary encounter diagnosis was Right arm pain. Diagnoses of Abdominal pain, unspecified abdominal location, Hypertensive emergency, and Unable to ambulate were also pertinent to this visit. has a past medical history of Arthritis, CAD (coronary artery disease), Clinical trial participant at discharge, Diabetes mellitus ([facility] [address] GERD (gastroesophageal reflux disease), H/O cardiovascular stress test, Hyperlipidemia, Hypertension, NSTEMI (non-ST elevated myocardial infarction) ([facility] [address] and Unspecified cerebral artery occlusion with cerebral infarction. has a past surgical history that includes Leg Surgery (Bilateral); Cholecystectomy; Colonoscopy; eye surgery; Tonsillectomy; Toe Surgery (Right, 8/21/15); Cardiac catheterization (2018); and Coronary angioplasty with stent ().       Restrictions  Restrictions/Precautions  Restrictions/Precautions: Fall Risk, General Precautions     Subjective   General  Chart Reviewed: Yes  Patient assessed for rehabilitation services?: Yes  Response to previous treatment: Patient with no complaints from previous session  Family / Caregiver Present: No  Referring Practitioner: Dr Woods Post  Diagnosis: Hypertensive

## 2020-04-28 NOTE — PROGRESS NOTES
Physical Therapy  Facility/Department: [facility] AT THE [facility] MED SURG  Daily Treatment Note  NAME: Howard Hernandez  : 1935  MRN: [id number]    Date of Service: 2020    Discharge Recommendations:  Continue to assess pending progress        Assessment   Treatment Diagnosis: left sided weakness; difficulty walking  Prognosis: Fair  REQUIRES PT FOLLOW UP: Yes  Activity Tolerance  Activity Tolerance: Patient Tolerated treatment well     Patient Diagnosis(es): The primary encounter diagnosis was Right arm pain. Diagnoses of Abdominal pain, unspecified abdominal location, Hypertensive emergency, and Unable to ambulate were also pertinent to this visit. has a past medical history of Arthritis, CAD (coronary artery disease), Clinical trial participant at discharge, Diabetes mellitus ([facility] [address] GERD (gastroesophageal reflux disease), H/O cardiovascular stress test, Hyperlipidemia, Hypertension, NSTEMI (non-ST elevated myocardial infarction) ([facility] [address] and Unspecified cerebral artery occlusion with cerebral infarction. has a past surgical history that includes Leg Surgery (Bilateral); Cholecystectomy; Colonoscopy; eye surgery; Tonsillectomy; Toe Surgery (Right, 8/21/15); Cardiac catheterization (2018); and Coronary angioplasty with stent (). Restrictions  Restrictions/Precautions  Restrictions/Precautions: Fall Risk, General Precautions  Subjective   General  Chart Reviewed: Yes  Response To Previous Treatment: Patient with no complaints from previous session. Family / Caregiver Present: No  Subjective  Subjective: Pt in bed upon arrival.  Reports B hip soreness from recent attempt at transfer with Dylan [employer] steady with nursing staff. Pt states transfer was unsuccessful. Orientation  Orientation  Overall Orientation Status: Within Functional Limits  Cognition      Objective   Bed mobility  Rolling to Right: Moderate assistance;Minimal assistance  Supine to Sit: Moderate assistance  Sit to Supine:  Moderate

## 2020-04-28 NOTE — PROGRESS NOTES
Physical Therapy  Facility/Department: [facility] AT THE [facility] MED SURG  Daily Treatment Note  NAME: Patel Maxwell  : 1935  MRN: [id number]    Date of Service: 2020    Discharge Recommendations:  Continue to assess pending progress        Assessment   Treatment Diagnosis: left sided weakness; difficulty walking  Prognosis: Fair  REQUIRES PT FOLLOW UP: Yes  Activity Tolerance  Activity Tolerance: Patient Tolerated treatment well     Patient Diagnosis(es): The primary encounter diagnosis was Right arm pain. Diagnoses of Abdominal pain, unspecified abdominal location, Hypertensive emergency, and Unable to ambulate were also pertinent to this visit. has a past medical history of Arthritis, CAD (coronary artery disease), Clinical trial participant at discharge, Diabetes mellitus ([facility] [address] GERD (gastroesophageal reflux disease), H/O cardiovascular stress test, Hyperlipidemia, Hypertension, NSTEMI (non-ST elevated myocardial infarction) ([facility] [address] and Unspecified cerebral artery occlusion with cerebral infarction. has a past surgical history that includes Leg Surgery (Bilateral); Cholecystectomy; Colonoscopy; eye surgery; Tonsillectomy; Toe Surgery (Right, 8/21/15); Cardiac catheterization (2018); and Coronary angioplasty with stent (). Restrictions  Restrictions/Precautions  Restrictions/Precautions: Fall Risk, General Precautions  Subjective   General  Chart Reviewed: Yes  Response To Previous Treatment: Patient with no complaints from previous session. Family / Caregiver Present: No  Subjective  Subjective: Pt seated in chair upon arrival.  Denies pain at this time. Pt reports she is supposed to d/c today. Orientation  Orientation  Overall Orientation Status: Within Functional Limits  Cognition      Objective   Bed mobility  Rolling to Right: Moderate assistance;Minimal assistance  Supine to Sit: Moderate assistance  Sit to Supine:  Moderate assistance;Maximum assistance  Scooting: Maximal

## 2020-04-28 NOTE — PROGRESS NOTES
5\" (165.1 cm)   · Current Body Wt: 157 lb 7 oz (71.4 kg)  · Admission Body Wt: 155 lb 14.4 oz (70.7 kg)  · Usual Body Wt: 174 lb (78.9 kg)(3/17/20)  · % Weight Change:  ,  9.4% weight loss x 1 month, 3.1% weight loss x 22 days  · Ideal Body Wt: 125 lb (56.7 kg), % Ideal Body 126%  · BMI Classification: BMI 25.0 - 29.9 Overweight  Lab Results   Component Value Date    LABA1C 6.7 03/21/2020     Recent Labs     04/27/20  0652 04/28/20  0541   * 128*   K 4.4 4.5   CL 94* 99   CO2 23 21   BUN 19 18   CREATININE 1.64* 1.52*   GLUCOSE 152* 126*   ALT 7  --    ALKPHOS 118*  --    GFR                            Lab Results   Component Value Date    LABALBU 3.9 04/27/2020      Recent Labs     04/27/20  1002 04/27/20  1710 04/27/20  1953   POCGLU 143* 144* 176*     Lab Results   Component Value Date    HDL 71 03/25/2018     Nutrition Interventions:   Continue current diet, Start ONS(4 oz Glucerna TID with meals)  Continued Inpatient Monitoring, Coordination of Care    Nutrition Evaluation:   · Evaluation: Goals set   · Goals: PO > 75% of meals and supplements    · Monitoring: Meal Intake, Supplement Intake, I&O, Weight, Pertinent Labs, Patient/Family Education, Nausea or Vomiting      Electronically signed by Bob Chavez RD, LD on 4/28/20 at 7:57 AM EDT    Contact Number: [phone]

## 2020-05-14 ENCOUNTER — PROCEDURE VISIT (OUTPATIENT)
Dept: UROLOGY | Age: 85
End: 2020-05-14
Payer: MEDICARE

## 2020-05-14 VITALS — BODY MASS INDEX: 25.79 KG/M2 | WEIGHT: 155 LBS | DIASTOLIC BLOOD PRESSURE: 66 MMHG | SYSTOLIC BLOOD PRESSURE: 136 MMHG

## 2020-05-14 PROCEDURE — G8417 CALC BMI ABV UP PARAM F/U: HCPCS | Performed by: UROLOGY

## 2020-05-14 PROCEDURE — 1123F ACP DISCUSS/DSCN MKR DOCD: CPT | Performed by: UROLOGY

## 2020-05-14 PROCEDURE — 52000 CYSTOURETHROSCOPY: CPT | Performed by: UROLOGY

## 2020-05-14 PROCEDURE — 1111F DSCHRG MED/CURRENT MED MERGE: CPT | Performed by: UROLOGY

## 2020-05-14 PROCEDURE — 1090F PRES/ABSN URINE INCON ASSESS: CPT | Performed by: UROLOGY

## 2020-05-14 PROCEDURE — G8400 PT W/DXA NO RESULTS DOC: HCPCS | Performed by: UROLOGY

## 2020-05-14 PROCEDURE — 99214 OFFICE O/P EST MOD 30 MIN: CPT | Performed by: UROLOGY

## 2020-05-14 PROCEDURE — G8427 DOCREV CUR MEDS BY ELIG CLIN: HCPCS | Performed by: UROLOGY

## 2020-05-14 PROCEDURE — 4040F PNEUMOC VAC/ADMIN/RCVD: CPT | Performed by: UROLOGY

## 2020-05-14 PROCEDURE — 1036F TOBACCO NON-USER: CPT | Performed by: UROLOGY

## 2020-05-14 RX ORDER — TROSPIUM CHLORIDE 20 MG/1
20 TABLET, FILM COATED ORAL 2 TIMES DAILY
Qty: 60 TABLET | Refills: 3 | Status: SHIPPED | OUTPATIENT
Start: 2020-05-14

## 2020-05-14 ASSESSMENT — ENCOUNTER SYMPTOMS
NAUSEA: 0
SHORTNESS OF BREATH: 0
WHEEZING: 0
EYE PAIN: 0
VOMITING: 0
COUGH: 0
ABDOMINAL PAIN: 0
EYE REDNESS: 0
BACK PAIN: 0
COLOR CHANGE: 0

## 2020-05-14 NOTE — PROGRESS NOTES
HPI:    Patient is a 81 y.o. female in no acute distress. She is alert and oriented to person, place, and time. History:  Patient was seen in consultation in the hospital on 4/27/2020. The patient is a 81 y.o. female who presented with urinary retention. Patient did present to the emergency department with complaints of urgency. She had incontinence that is new or worsening over the last couple of months. She did claim that this incontinence is worse when she lies down. Patient did have a void of 100 mL on the bedpan. She did have postvoid residual greater than 300 mL. Patient was unable to get up to the commode and void approximately 500 mL. At this point time primary team has ordered Craft catheter placement. Patient did have a recent urinalysis. This did not show any significant abnormalities on it. Patient is never seen urology in the past.  She does not regularly see gynecology. She has no flank pain nausea vomiting. Patient did get a recent CT scan. This film was independently reviewed today. This does show a 3 mm nonobstructing stone in the right kidney. Otherwise no significant  abnormalities. Patient is never take any medications to help her void. She was vague in her history due to mentation. Currently:  Patient is here today for lower urinary tract visualization with cystoscopy and for pelvic exam.  Hospitalized and had urinary retention. Cystoscopy Procedure Note    Pre-operative Diagnosis: urinary retention    Post-operative Diagnosis: urinary retention    Surgeon: Christa Wilson     Assistants: None    Anesthesia : Local    Procedure Details   The risks, benefits, complications, treatment options, and expected outcomes were discussed with the patient. The patient concurred with the proposed plan, giving informed consent. Cystoscopy was performed today under local anesthesia, using sterile technique.  The patient was placed in the dorsal lithotomy position, prepped with CHG, and draped in the usual sterile fashion. A 14 French flexible cystoscope was used to systematically inspect both the urethra and bladder in their entirety. Findings:  Anterior urethra: normal without strictures  Hyperplasia: na  Bladder: Normal mucosa, without lesions. Ureteral orifice(s) was/were seen in the normal position and effluxing clear urine  Trabeculations No  Diverticulum No  Description: minor cystocele         Specimens: Cytology/urine culture No                 Complications:  None; patient tolerated the procedure well.            Disposition: home           Condition: stable          Past Medical History:   Diagnosis Date    Arthritis     CAD (coronary artery disease)     Clinical trial participant at discharge 03/26/2018     disqualified    Diabetes mellitus ([facility] [address]     GERD (gastroesophageal reflux disease)     H/O cardiovascular stress test 11/2017    NORMAL    Hyperlipidemia     Hypertension     NSTEMI (non-ST elevated myocardial infarction) ([facility] [address] 03/24/2018    Unspecified cerebral artery occlusion with cerebral infarction 2003     Past Surgical History:   Procedure Laterality Date    CARDIAC CATHETERIZATION  03/26/2018    DR Aliza Deras    CHOLECYSTECTOMY      COLONOSCOPY      CORONARY ANGIOPLASTY WITH STENT PLACEMENT  2002    EYE SURGERY      LEG SURGERY Bilateral     TOE SURGERY Right 8/21/15    3rd digit skin plasty/Dr. Akash Nunes. agrawal CRNA/Lila Juan    TONSILLECTOMY       Outpatient Encounter Medications as of 5/14/2020   Medication Sig Dispense Refill    acetaminophen (TYLENOL) 325 MG tablet Take 650 mg by mouth every 6 hours as needed for Pain      amLODIPine (NORVASC) 10 MG tablet Take 10 mg by mouth daily       METOPROLOL TARTRATE PO Take 100 mg by mouth 2 times daily       ESOMEPRAZOLE MAGNESIUM PO Take 40 mg by mouth daily       glipiZIDE (GLUCOTROL) 5 MG tablet Take 5 mg by mouth 2 times daily (before meals) Extended release      aspirin 81 MG tablet Take 81 mg by mouth daily      metFORMIN (GLUCOPHAGE) 1000 MG tablet Take 1,000 mg by mouth daily (with breakfast)       No facility-administered encounter medications on file as of 5/14/2020. Current Outpatient Medications on File Prior to Visit   Medication Sig Dispense Refill    acetaminophen (TYLENOL) 325 MG tablet Take 650 mg by mouth every 6 hours as needed for Pain      amLODIPine (NORVASC) 10 MG tablet Take 10 mg by mouth daily       METOPROLOL TARTRATE PO Take 100 mg by mouth 2 times daily       ESOMEPRAZOLE MAGNESIUM PO Take 40 mg by mouth daily       glipiZIDE (GLUCOTROL) 5 MG tablet Take 5 mg by mouth 2 times daily (before meals) Extended release      aspirin 81 MG tablet Take 81 mg by mouth daily      metFORMIN (GLUCOPHAGE) 1000 MG tablet Take 1,000 mg by mouth daily (with breakfast)       No current facility-administered medications on file prior to visit. Patient has no known allergies. No family history on file. Social History     Tobacco Use   Smoking Status Never Smoker   Smokeless Tobacco Never Used       Social History     Substance and Sexual Activity   Alcohol Use Yes    Comment: occ       Review of Systems   Constitutional: Negative for appetite change, chills and fever. Eyes: Negative for pain, redness and visual disturbance. Respiratory: Negative for cough, shortness of breath and wheezing. Cardiovascular: Negative for chest pain and leg swelling. Gastrointestinal: Negative for abdominal pain, nausea and vomiting. Genitourinary: Negative for difficulty urinating, dysuria, flank pain, frequency, hematuria, pelvic pain, vaginal bleeding and vaginal discharge. Musculoskeletal: Negative for back pain, joint swelling and myalgias. Skin: Negative for color change, rash and wound. Neurological: Negative for dizziness, tremors and numbness. Hematological: Negative for adenopathy. Does not bruise/bleed easily.        There were no vitals taken for this

## 2020-05-22 ENCOUNTER — APPOINTMENT (OUTPATIENT)
Dept: GENERAL RADIOLOGY | Age: 85
DRG: 280 | End: 2020-05-22
Payer: MEDICARE

## 2020-05-22 ENCOUNTER — HOSPITAL ENCOUNTER (EMERGENCY)
Age: 85
Discharge: HOME | DRG: 280 | End: 2020-05-22
Attending: EMERGENCY MEDICINE
Payer: MEDICARE

## 2020-05-22 ENCOUNTER — APPOINTMENT (OUTPATIENT)
Dept: CT IMAGING | Age: 85
DRG: 280 | End: 2020-05-22
Payer: MEDICARE

## 2020-05-22 VITALS
RESPIRATION RATE: 18 BRPM | SYSTOLIC BLOOD PRESSURE: 162 MMHG | HEART RATE: 83 BPM | BODY MASS INDEX: 25.79 KG/M2 | DIASTOLIC BLOOD PRESSURE: 70 MMHG | TEMPERATURE: 96.6 F | OXYGEN SATURATION: 97 % | WEIGHT: 155 LBS

## 2020-05-22 PROBLEM — E16.2 HYPOGLYCEMIA: Status: ACTIVE | Noted: 2020-05-22

## 2020-05-22 PROBLEM — N30.00 ACUTE CYSTITIS WITHOUT HEMATURIA: Status: ACTIVE | Noted: 2020-05-22

## 2020-05-22 PROBLEM — R41.82 ALTERED MENTAL STATUS: Status: ACTIVE | Noted: 2020-05-22

## 2020-05-22 LAB
-: ABNORMAL
ABSOLUTE EOS #: 0.19 K/UL (ref 0–0.44)
ABSOLUTE IMMATURE GRANULOCYTE: <0.03 K/UL (ref 0–0.3)
ABSOLUTE LYMPH #: 0.95 K/UL (ref 1.1–3.7)
ABSOLUTE MONO #: 0.91 K/UL (ref 0.1–1.2)
ALBUMIN SERPL-MCNC: 3.4 G/DL (ref 3.5–5.2)
ALBUMIN/GLOBULIN RATIO: 1.4 (ref 1–2.5)
ALP BLD-CCNC: 85 U/L (ref 35–104)
ALT SERPL-CCNC: 6 U/L (ref 5–33)
AMMONIA: 13 UMOL/L (ref 11–51)
AMORPHOUS: ABNORMAL
ANION GAP SERPL CALCULATED.3IONS-SCNC: 12 MMOL/L (ref 9–17)
AST SERPL-CCNC: 10 U/L
BACTERIA: ABNORMAL
BASOPHILS # BLD: 0 % (ref 0–2)
BASOPHILS ABSOLUTE: 0.03 K/UL (ref 0–0.2)
BILIRUB SERPL-MCNC: 0.25 MG/DL (ref 0.3–1.2)
BILIRUBIN URINE: NEGATIVE
BUN BLDV-MCNC: 13 MG/DL (ref 8–23)
BUN/CREAT BLD: 9 (ref 9–20)
CALCIUM SERPL-MCNC: 8.5 MG/DL (ref 8.6–10.4)
CASTS UA: ABNORMAL /LPF
CHLORIDE BLD-SCNC: 97 MMOL/L (ref 98–107)
CHP ED QC CHECK: YES
CHP ED QC CHECK: YES
CO2: 20 MMOL/L (ref 20–31)
COLOR: ABNORMAL
COMMENT UA: ABNORMAL
CREAT SERPL-MCNC: 1.38 MG/DL (ref 0.5–0.9)
CRYSTALS, UA: ABNORMAL /HPF
DIFFERENTIAL TYPE: ABNORMAL
EOSINOPHILS RELATIVE PERCENT: 3 % (ref 1–4)
EPITHELIAL CELLS UA: ABNORMAL /HPF (ref 0–25)
GFR AFRICAN AMERICAN: 44 ML/MIN
GFR NON-AFRICAN AMERICAN: 37 ML/MIN
GFR SERPL CREATININE-BSD FRML MDRD: ABNORMAL ML/MIN/{1.73_M2}
GFR SERPL CREATININE-BSD FRML MDRD: ABNORMAL ML/MIN/{1.73_M2}
GLUCOSE BLD-MCNC: 109 MG/DL (ref 74–100)
GLUCOSE BLD-MCNC: 124 MG/DL (ref 74–100)
GLUCOSE BLD-MCNC: 199 MG/DL (ref 70–99)
GLUCOSE BLD-MCNC: 28 MG/DL
GLUCOSE BLD-MCNC: 38 MG/DL
GLUCOSE URINE: NEGATIVE
HCT VFR BLD CALC: 32.1 % (ref 36.3–47.1)
HEMOGLOBIN: 10.4 G/DL (ref 11.9–15.1)
IMMATURE GRANULOCYTES: 0 %
KETONES, URINE: NEGATIVE
LACTIC ACID: 1.5 MMOL/L (ref 0.5–2.2)
LEUKOCYTE ESTERASE, URINE: ABNORMAL
LYMPHOCYTES # BLD: 13 % (ref 24–43)
MCH RBC QN AUTO: 30.8 PG (ref 25.2–33.5)
MCHC RBC AUTO-ENTMCNC: 32.4 G/DL (ref 28.4–34.8)
MCV RBC AUTO: 95 FL (ref 82.6–102.9)
MONOCYTES # BLD: 12 % (ref 3–12)
MUCUS: ABNORMAL
NITRITE, URINE: NEGATIVE
NRBC AUTOMATED: 0 PER 100 WBC
OTHER OBSERVATIONS UA: ABNORMAL
PDW BLD-RTO: 13.1 % (ref 11.8–14.4)
PH UA: 6 (ref 5–9)
PLATELET # BLD: 229 K/UL (ref 138–453)
PLATELET ESTIMATE: ABNORMAL
PMV BLD AUTO: 9.6 FL (ref 8.1–13.5)
POTASSIUM SERPL-SCNC: 4.3 MMOL/L (ref 3.7–5.3)
PROTEIN UA: NEGATIVE
RBC # BLD: 3.38 M/UL (ref 3.95–5.11)
RBC # BLD: ABNORMAL 10*6/UL
RBC UA: ABNORMAL /HPF (ref 0–2)
RENAL EPITHELIAL, UA: ABNORMAL /HPF
SEG NEUTROPHILS: 72 % (ref 36–65)
SEGMENTED NEUTROPHILS ABSOLUTE COUNT: 5.26 K/UL (ref 1.5–8.1)
SODIUM BLD-SCNC: 129 MMOL/L (ref 135–144)
SPECIFIC GRAVITY UA: 1.01 (ref 1.01–1.02)
TOTAL PROTEIN: 5.8 G/DL (ref 6.4–8.3)
TRICHOMONAS: ABNORMAL
TURBIDITY: ABNORMAL
URINE HGB: ABNORMAL
UROBILINOGEN, URINE: NORMAL
WBC # BLD: 7.4 K/UL (ref 3.5–11.3)
WBC # BLD: ABNORMAL 10*3/UL
WBC UA: ABNORMAL /HPF (ref 0–5)
YEAST: ABNORMAL

## 2020-05-22 PROCEDURE — 87086 URINE CULTURE/COLONY COUNT: CPT

## 2020-05-22 PROCEDURE — 87186 SC STD MICRODIL/AGAR DIL: CPT

## 2020-05-22 PROCEDURE — 36415 COLL VENOUS BLD VENIPUNCTURE: CPT

## 2020-05-22 PROCEDURE — 83605 ASSAY OF LACTIC ACID: CPT

## 2020-05-22 PROCEDURE — 80053 COMPREHEN METABOLIC PANEL: CPT

## 2020-05-22 PROCEDURE — 99284 EMERGENCY DEPT VISIT MOD MDM: CPT

## 2020-05-22 PROCEDURE — 71045 X-RAY EXAM CHEST 1 VIEW: CPT

## 2020-05-22 PROCEDURE — 87077 CULTURE AEROBIC IDENTIFY: CPT

## 2020-05-22 PROCEDURE — 81001 URINALYSIS AUTO W/SCOPE: CPT

## 2020-05-22 PROCEDURE — 85025 COMPLETE CBC W/AUTO DIFF WBC: CPT

## 2020-05-22 PROCEDURE — 82140 ASSAY OF AMMONIA: CPT

## 2020-05-22 PROCEDURE — 82947 ASSAY GLUCOSE BLOOD QUANT: CPT

## 2020-05-22 PROCEDURE — 2580000003 HC RX 258: Performed by: EMERGENCY MEDICINE

## 2020-05-22 PROCEDURE — 6370000000 HC RX 637 (ALT 250 FOR IP): Performed by: EMERGENCY MEDICINE

## 2020-05-22 RX ORDER — DEXTROSE MONOHYDRATE 25 G/50ML
12.5 INJECTION, SOLUTION INTRAVENOUS ONCE
Status: DISCONTINUED | OUTPATIENT
Start: 2020-05-22 | End: 2020-05-22

## 2020-05-22 RX ORDER — SULFAMETHOXAZOLE AND TRIMETHOPRIM 800; 160 MG/1; MG/1
1 TABLET ORAL 2 TIMES DAILY
Qty: 20 TABLET | Refills: 0 | Status: ON HOLD | OUTPATIENT
Start: 2020-05-22 | End: 2020-05-26

## 2020-05-22 RX ORDER — ACETAMINOPHEN 325 MG/1
650 TABLET ORAL ONCE
Status: COMPLETED | OUTPATIENT
Start: 2020-05-22 | End: 2020-05-22

## 2020-05-22 RX ORDER — SULFAMETHOXAZOLE AND TRIMETHOPRIM 800; 160 MG/1; MG/1
1 TABLET ORAL ONCE
Status: COMPLETED | OUTPATIENT
Start: 2020-05-22 | End: 2020-05-22

## 2020-05-22 RX ORDER — IBUPROFEN 600 MG/1
600 TABLET ORAL ONCE
Status: COMPLETED | OUTPATIENT
Start: 2020-05-22 | End: 2020-05-22

## 2020-05-22 RX ORDER — DEXTROSE MONOHYDRATE 25 G/50ML
25 INJECTION, SOLUTION INTRAVENOUS ONCE
Status: COMPLETED | OUTPATIENT
Start: 2020-05-22 | End: 2020-05-22

## 2020-05-22 RX ORDER — ACETAMINOPHEN 325 MG/1
TABLET ORAL
Status: DISPENSED
Start: 2020-05-22 | End: 2020-05-22

## 2020-05-22 RX ORDER — 0.9 % SODIUM CHLORIDE 0.9 %
500 INTRAVENOUS SOLUTION INTRAVENOUS ONCE
Status: COMPLETED | OUTPATIENT
Start: 2020-05-22 | End: 2020-05-22

## 2020-05-22 RX ADMIN — SULFAMETHOXAZOLE AND TRIMETHOPRIM 1 TABLET: 800; 160 TABLET ORAL at 08:34

## 2020-05-22 RX ADMIN — IBUPROFEN 600 MG: 600 TABLET, FILM COATED ORAL at 08:33

## 2020-05-22 RX ADMIN — DEXTROSE MONOHYDRATE 25 G: 25 INJECTION, SOLUTION INTRAVENOUS at 06:26

## 2020-05-22 RX ADMIN — ACETAMINOPHEN 650 MG: 325 TABLET, FILM COATED ORAL at 07:51

## 2020-05-22 RX ADMIN — SODIUM CHLORIDE 500 ML: 9 INJECTION, SOLUTION INTRAVENOUS at 06:41

## 2020-05-22 ASSESSMENT — ENCOUNTER SYMPTOMS
ABDOMINAL PAIN: 0
SHORTNESS OF BREATH: 0

## 2020-05-22 ASSESSMENT — PAIN SCALES - GENERAL
PAINLEVEL_OUTOF10: 8
PAINLEVEL_OUTOF10: 0
PAINLEVEL_OUTOF10: 8

## 2020-05-22 NOTE — ED PROVIDER NOTES
HPI:  5/22/20,   Time: 6:26 AM EDT         Genoveva Waite is a 81 y.o. female presenting to the ED for confusion/altered mental status with decreased responsiveness , beginning just noted approximately 20 minutes  ago. The complaint has been constant, moderate in severity, and worsened by nothing. Blood sugar in the 50s according to the squad but actually 38 according to our glucometer. History limited based on patient's mental status at this time    ROS:   Pertinent positives and negatives are stated within HPI, all other systems reviewed and are negative.  --------------------------------------------- PAST HISTORY ---------------------------------------------  Past Medical History:  has a past medical history of Arthritis, CAD (coronary artery disease), Clinical trial participant at discharge, Diabetes mellitus ([facility] [address] GERD (gastroesophageal reflux disease), H/O cardiovascular stress test, History of non-ST elevation myocardial infarction (NSTEMI), Hyperlipidemia, Hypertension, and Unspecified cerebral artery occlusion with cerebral infarction. Past Surgical History:  has a past surgical history that includes Leg Surgery (Bilateral); Cholecystectomy; Colonoscopy; eye surgery; Tonsillectomy; Toe Surgery (Right, 8/21/15); Coronary angioplasty with stent (2002); and Coronary angioplasty with stent (03/26/2018). Social History:  reports that she has never smoked. She has never used smokeless tobacco. She reports current alcohol use. Family History: family history is not on file. The patients home medications have been reviewed. Allergies: Patient has no known allergies.     -------------------------------------------------- RESULTS -------------------------------------------------  All laboratory and radiology results have been personally reviewed by myself   LABS:  Results for orders placed or performed during the hospital encounter of 05/22/20   Culture, Urine   Result Value Ref Range Specimen Description . URINE,STRAIGHT CATHETER     Special Requests NOT REPORTED     Culture ENTEROBACTER CLOACAE >[id number] CFU/ML (A)     Culture (A)      AEROCOCCUS URINAE There are no CLSI interpretive guidelines for routine susceptibility testing. Aerococcus species are reported to be susceptible to penicillin. A. urinae has also been described as susceptible to amoxicillin and nitrofurantoin (for treatment of urinary tract infections only).  >[id number] CFU/ML       Susceptibility    Enterobacter cloacae - BACTERIAL SUSCEPTIBILITY PANEL ANGELIQUE     amikacin Value in next row        NOT REPORTED     aztreonam Value in next row Sensitive       <=1SUSCEPTIBLE     ceFAZolin Value in next row        NOT REPORTED     cefepime Value in next row        NOT REPORTED     cefTRIAXone Value in next row Sensitive       <=1SUSCEPTIBLE     ciprofloxacin Value in next row Sensitive       <=0.25SUSCEPTIBLE     ertapenem Value in next row        NOT REPORTED     gentamicin Value in next row Sensitive       <=1SUSCEPTIBLE     meropenem Value in next row        NOT REPORTED     nitrofurantoin Value in next row Sensitive       32SUSCEPTIBLE     tigecycline Value in next row        NOT REPORTED     tobramycin Value in next row Sensitive       <=1SUSCEPTIBLE     trimethoprim-sulfamethoxazole Value in next row Sensitive       <=20SUSCEPTIBLE     piperacillin-tazobactam Value in next row Sensitive       <=4SUSCEPTIBLE   CBC Auto Differential   Result Value Ref Range    WBC 7.4 3.5 - 11.3 k/uL    RBC 3.38 (L) 3.95 - 5.11 m/uL    Hemoglobin 10.4 (L) 11.9 - 15.1 g/dL    Hematocrit 32.1 (L) 36.3 - 47.1 %    MCV 95.0 82.6 - 102.9 fL    MCH 30.8 25.2 - 33.5 pg    MCHC 32.4 28.4 - 34.8 g/dL    RDW 13.1 11.8 - 14.4 %    Platelets 292 424 - 318 k/uL    MPV 9.6 8.1 - 13.5 fL    NRBC Automated 0.0 0.0 per 100 WBC    Differential Type NOT REPORTED     Seg Neutrophils 72 (H) 36 - 65 %    Lymphocytes 13 (L) 24 - 43 %    Monocytes 12 3 - 12 %    Eosinophils % 0 TO 2 0 - 2 /HPF    Casts UA NOT REPORTED /LPF    Crystals, UA NOT REPORTED None /HPF    Epithelial Cells UA 0 TO 2 0 - 25 /HPF    Renal Epithelial, UA NOT REPORTED 0 /HPF    Bacteria, UA 2+ (A) None    Mucus, UA NOT REPORTED None    Trichomonas, UA NOT REPORTED None    Amorphous, UA NOT REPORTED None    Other Observations UA NOT REPORTED NOT REQ. Yeast, UA NOT REPORTED None   Glucose, Whole Blood   Result Value Ref Range    POC Glucose 109 (H) 74 - 100 mg/dL   Glucose, Whole Blood   Result Value Ref Range    POC Glucose 124 (H) 74 - 100 mg/dL   POCT glucose   Result Value Ref Range    Glucose 28 mg/dL    QC OK? yes    POCT glucose   Result Value Ref Range    Glucose 38 mg/dL    QC OK? yes        RADIOLOGY:  Interpreted by Radiologist.  XR CHEST PORTABLE   Final Result   1. No acute focal airspace consolidation. 2. Old granulomatous disease. 3. Stable mild cardiomegaly. ------------------------- NURSING NOTES AND VITALS REVIEWED ---------------------------   The nursing notes within the ED encounter and vital signs as below have been reviewed. BP (!) 162/70   Pulse 83   Temp 96.6 °F (35.9 °C)   Resp 18   Wt 155 lb (70.3 kg)   SpO2 97%   BMI 25.79 kg/m²   Oxygen Saturation Interpretation: Normal      ---------------------------------------------------PHYSICAL EXAM--------------------------------------      Constitutional/General: Alert and oriented x2, well appearing, non toxic in NAD, mentally slowed and confused  Head: NC/AT  Eyes: PERRL, EOMI  Mouth: Oropharynx clear, handling secretions, no trismus  Neck: Supple, full ROM, no meningeal signs  Pulmonary: Lungs clear to auscultation bilaterally, no wheezes, rales, or rhonchi. Not in respiratory distress  Cardiovascular:  Regular rate and rhythm, no murmurs, gallops, or rubs. 2+ distal pulses  Abdomen: Soft, non tender, non distended,   Extremities: Moves all extremities x 4.  Warm and well perfused  Skin: warm and dry without

## 2020-05-22 NOTE — ED PROVIDER NOTES
2002    EYE SURGERY      LEG SURGERY Bilateral     TOE SURGERY Right 8/21/15    3rd digit skin plasty/Dr. Carla Reeves. agrawal CRNA/Lila Juan    TONSILLECTOMY           CURRENT MEDICATIONS       Previous Medications    ACETAMINOPHEN (TYLENOL) 325 MG TABLET    Take 650 mg by mouth every 6 hours as needed for Pain    AMLODIPINE (NORVASC) 10 MG TABLET    Take 10 mg by mouth daily     ASPIRIN 81 MG TABLET    Take 81 mg by mouth daily    ESOMEPRAZOLE MAGNESIUM PO    Take 40 mg by mouth daily     GLIPIZIDE (GLUCOTROL) 5 MG TABLET    Take 5 mg by mouth 2 times daily (before meals) Extended release    METFORMIN (GLUCOPHAGE) 1000 MG TABLET    Take 1,000 mg by mouth daily (with breakfast)    METOPROLOL TARTRATE PO    Take 100 mg by mouth 2 times daily     TROSPIUM (SANCTURA) 20 MG TABLET    Take 1 tablet by mouth 2 times daily       ALLERGIES     Patient has no known allergies. FAMILY HISTORY     History reviewed. No pertinent family history.        SOCIAL HISTORY       Social History     Socioeconomic History    Marital status:      Spouse name: None    Number of children: None    Years of education: None    Highest education level: None   Occupational History    None   Social Needs    Financial resource strain: None    Food insecurity     Worry: None     Inability: None    Transportation needs     Medical: None     Non-medical: None   Tobacco Use    Smoking status: Never Smoker    Smokeless tobacco: Never Used   Substance and Sexual Activity    Alcohol use: Yes     Comment: occ    Drug use: None    Sexual activity: None   Lifestyle    Physical activity     Days per week: None     Minutes per session: None    Stress: None   Relationships    Social connections     Talks on phone: None     Gets together: None     Attends Scientologist service: None     Active member of club or organization: None     Attends meetings of clubs or organizations: None     Relationship status: None    Intimate partner available at the time of this note:    XR CHEST PORTABLE    (Results Pending)         ED BEDSIDE ULTRASOUND:   Performed by ED Physician - none    LABS:  Labs Reviewed   CBC WITH AUTO DIFFERENTIAL - Abnormal; Notable for the following components:       Result Value    RBC 3.38 (*)     Hemoglobin 10.4 (*)     Hematocrit 32.1 (*)     Seg Neutrophils 72 (*)     Lymphocytes 13 (*)     Absolute Lymph # 0.95 (*)     All other components within normal limits   POCT GLUCOSE - Abnormal   POCT GLUCOSE - Abnormal   COMPREHENSIVE METABOLIC PANEL W/ REFLEX TO MG FOR LOW K   URINE RT REFLEX TO CULTURE   LACTIC ACID   AMMONIA       EMERGENCY DEPARTMENT COURSE and DIFFERENTIAL DIAGNOSIS/MDM:   Vitals:    Vitals:    05/22/20 0621   BP: (!) 151/74   Pulse: 81   Resp: 18   Temp: 96.6 °F (35.9 °C)   SpO2: 98%   Weight: 155 lb (70.3 kg)           MDM    CONSULTS:  None    PROCEDURES:  Unless otherwise noted below, none     Procedures    FINAL IMPRESSION      1. Altered mental status, unspecified altered mental status type Improving   2. Hypoglycemia New Problem         DISPOSITION/PLAN   DISPOSITION        PATIENT REFERRED TO:  No follow-up provider specified.     DISCHARGE MEDICATIONS:  New Prescriptions    No medications on file              (Please note that portions ofthis note were completed with a voice recognition program.  Efforts were made to edit the dictations but occasionally words are mis-transcribed.)      Kiel Zeng MD (electronically signed)  Attending Emergency Physician         Ariadna Smith MD  05/22/20 0999

## 2020-05-24 ENCOUNTER — APPOINTMENT (OUTPATIENT)
Dept: CT IMAGING | Age: 85
DRG: 280 | End: 2020-05-24
Payer: MEDICARE

## 2020-05-24 ENCOUNTER — APPOINTMENT (OUTPATIENT)
Dept: GENERAL RADIOLOGY | Age: 85
DRG: 280 | End: 2020-05-24
Payer: MEDICARE

## 2020-05-24 ENCOUNTER — HOSPITAL ENCOUNTER (INPATIENT)
Age: 85
LOS: 2 days | Discharge: HOSPICE | DRG: 280 | End: 2020-05-26
Attending: EMERGENCY MEDICINE | Admitting: INTERNAL MEDICINE
Payer: MEDICARE

## 2020-05-24 PROBLEM — I24.9 ACUTE CORONARY SYNDROME (HCC): Status: RESOLVED | Noted: 2018-03-24 | Resolved: 2020-05-24

## 2020-05-24 PROBLEM — Z79.4 TYPE 2 DIABETES MELLITUS, WITH LONG-TERM CURRENT USE OF INSULIN (HCC): Chronic | Status: ACTIVE | Noted: 2020-03-23

## 2020-05-24 PROBLEM — Z79.4 TYPE 2 DIABETES MELLITUS, WITH LONG-TERM CURRENT USE OF INSULIN (HCC): Chronic | Status: RESOLVED | Noted: 2020-03-23 | Resolved: 2020-05-24

## 2020-05-24 PROBLEM — I25.2 HISTORY OF NON-ST ELEVATION MYOCARDIAL INFARCTION (NSTEMI): Status: ACTIVE | Noted: 2018-03-24

## 2020-05-24 PROBLEM — E11.9 TYPE 2 DIABETES MELLITUS, WITH LONG-TERM CURRENT USE OF INSULIN (HCC): Chronic | Status: ACTIVE | Noted: 2020-03-23

## 2020-05-24 PROBLEM — I25.2 HISTORY OF NON-ST ELEVATION MYOCARDIAL INFARCTION (NSTEMI): Chronic | Status: ACTIVE | Noted: 2018-03-24

## 2020-05-24 PROBLEM — Z95.5 S/P DRUG ELUTING CORONARY STENT PLACEMENT: Chronic | Status: ACTIVE | Noted: 2018-03-26

## 2020-05-24 PROBLEM — R35.0 FREQUENCY OF MICTURITION: Status: RESOLVED | Noted: 2020-04-27 | Resolved: 2020-05-24

## 2020-05-24 PROBLEM — N17.9 ACUTE KIDNEY INJURY (HCC): Status: ACTIVE | Noted: 2020-05-24

## 2020-05-24 PROBLEM — M25.552 LEFT HIP PAIN: Status: RESOLVED | Noted: 2018-03-20 | Resolved: 2020-05-24

## 2020-05-24 PROBLEM — I16.1 HYPERTENSIVE EMERGENCY: Status: RESOLVED | Noted: 2020-04-27 | Resolved: 2020-05-24

## 2020-05-24 PROBLEM — E11.9 TYPE 2 DIABETES MELLITUS, WITH LONG-TERM CURRENT USE OF INSULIN (HCC): Chronic | Status: RESOLVED | Noted: 2020-03-23 | Resolved: 2020-05-24

## 2020-05-24 PROBLEM — R33.9 URINARY RETENTION: Status: RESOLVED | Noted: 2020-04-27 | Resolved: 2020-05-24

## 2020-05-24 LAB
ABSOLUTE EOS #: 0.41 K/UL (ref 0–0.44)
ABSOLUTE IMMATURE GRANULOCYTE: 0.03 K/UL (ref 0–0.3)
ABSOLUTE LYMPH #: 1.4 K/UL (ref 1.1–3.7)
ABSOLUTE MONO #: 0.95 K/UL (ref 0.1–1.2)
ALBUMIN SERPL-MCNC: 3.9 G/DL (ref 3.5–5.2)
ALBUMIN/GLOBULIN RATIO: 1.3 (ref 1–2.5)
ALLEN TEST: ABNORMAL
ALP BLD-CCNC: 96 U/L (ref 35–104)
ALT SERPL-CCNC: <5 U/L (ref 5–33)
ANION GAP SERPL CALCULATED.3IONS-SCNC: 11 MMOL/L (ref 9–17)
ANION GAP SERPL CALCULATED.3IONS-SCNC: 17 MMOL/L (ref 9–17)
AST SERPL-CCNC: 15 U/L
BASOPHILS # BLD: 1 % (ref 0–2)
BASOPHILS ABSOLUTE: 0.04 K/UL (ref 0–0.2)
BILIRUB SERPL-MCNC: 0.2 MG/DL (ref 0.3–1.2)
BNP INTERPRETATION: ABNORMAL
BUN BLDV-MCNC: 12 MG/DL (ref 8–23)
BUN BLDV-MCNC: 13 MG/DL (ref 8–23)
BUN/CREAT BLD: 7 (ref 9–20)
BUN/CREAT BLD: 7 (ref 9–20)
CALCIUM SERPL-MCNC: 8.2 MG/DL (ref 8.6–10.4)
CALCIUM SERPL-MCNC: 8.8 MG/DL (ref 8.6–10.4)
CARBOXYHEMOGLOBIN: ABNORMAL % (ref 0–5)
CHLORIDE BLD-SCNC: 92 MMOL/L (ref 98–107)
CHLORIDE BLD-SCNC: 99 MMOL/L (ref 98–107)
CHP ED QC CHECK: NORMAL
CHP ED QC CHECK: YES
CO2: 16 MMOL/L (ref 20–31)
CO2: 18 MMOL/L (ref 20–31)
CREAT SERPL-MCNC: 1.63 MG/DL (ref 0.5–0.9)
CREAT SERPL-MCNC: 1.79 MG/DL (ref 0.5–0.9)
CULTURE: ABNORMAL
CULTURE: ABNORMAL
DIFFERENTIAL TYPE: ABNORMAL
EKG ATRIAL RATE: 69 BPM
EKG ATRIAL RATE: 84 BPM
EKG P AXIS: 19 DEGREES
EKG P AXIS: 35 DEGREES
EKG P-R INTERVAL: 204 MS
EKG P-R INTERVAL: 206 MS
EKG Q-T INTERVAL: 380 MS
EKG Q-T INTERVAL: 434 MS
EKG QRS DURATION: 110 MS
EKG QRS DURATION: 116 MS
EKG QTC CALCULATION (BAZETT): 449 MS
EKG QTC CALCULATION (BAZETT): 465 MS
EKG R AXIS: 14 DEGREES
EKG R AXIS: 16 DEGREES
EKG T AXIS: 25 DEGREES
EKG T AXIS: 99 DEGREES
EKG VENTRICULAR RATE: 69 BPM
EKG VENTRICULAR RATE: 84 BPM
EOSINOPHILS RELATIVE PERCENT: 5 % (ref 1–4)
ESTIMATED AVERAGE GLUCOSE: 123 MG/DL
FIO2: 50
GFR AFRICAN AMERICAN: 33 ML/MIN
GFR AFRICAN AMERICAN: 37 ML/MIN
GFR NON-AFRICAN AMERICAN: 27 ML/MIN
GFR NON-AFRICAN AMERICAN: 30 ML/MIN
GFR SERPL CREATININE-BSD FRML MDRD: ABNORMAL ML/MIN/{1.73_M2}
GLUCOSE BLD-MCNC: 128 MG/DL (ref 74–100)
GLUCOSE BLD-MCNC: 135 MG/DL
GLUCOSE BLD-MCNC: 135 MG/DL (ref 74–100)
GLUCOSE BLD-MCNC: 160 MG/DL (ref 74–100)
GLUCOSE BLD-MCNC: 175 MG/DL (ref 74–100)
GLUCOSE BLD-MCNC: 270 MG/DL (ref 74–100)
GLUCOSE BLD-MCNC: 61 MG/DL (ref 74–100)
GLUCOSE BLD-MCNC: 66 MG/DL (ref 74–100)
GLUCOSE BLD-MCNC: 71 MG/DL (ref 70–99)
GLUCOSE BLD-MCNC: 83 MG/DL
GLUCOSE BLD-MCNC: 83 MG/DL (ref 74–100)
GLUCOSE BLD-MCNC: 93 MG/DL (ref 70–99)
HBA1C MFR BLD: 5.9 % (ref 4.8–5.9)
HCO3 ARTERIAL: 16.3 MMOL/L (ref 22–26)
HCT VFR BLD CALC: 35 % (ref 36.3–47.1)
HEMOGLOBIN: 11.2 G/DL (ref 11.9–15.1)
IMMATURE GRANULOCYTES: 0 %
LYMPHOCYTES # BLD: 16 % (ref 24–43)
Lab: ABNORMAL
MCH RBC QN AUTO: 30.9 PG (ref 25.2–33.5)
MCHC RBC AUTO-ENTMCNC: 32 G/DL (ref 28.4–34.8)
MCV RBC AUTO: 93.7 FL (ref 82.6–102.9)
METHEMOGLOBIN: ABNORMAL % (ref 0–1.9)
MODE: ABNORMAL
MONOCYTES # BLD: 11 % (ref 3–12)
NEGATIVE BASE EXCESS, ART: 12 MMOL/L (ref 0–2)
NOTIFICATION TIME: ABNORMAL
NOTIFICATION: ABNORMAL
NRBC AUTOMATED: 0 PER 100 WBC
O2 DEVICE/FLOW/%: ABNORMAL
O2 SAT, ARTERIAL: 96.9 % (ref 95–98)
OXYHEMOGLOBIN: ABNORMAL % (ref 95–98)
PATIENT TEMP: 37
PCO2 ARTERIAL: 45.5 MMHG (ref 35–45)
PCO2, ART, TEMP ADJ: ABNORMAL (ref 35–45)
PDW BLD-RTO: 13.2 % (ref 11.8–14.4)
PEEP/CPAP: ABNORMAL
PH ARTERIAL: 7.17 (ref 7.35–7.45)
PH, ART, TEMP ADJ: ABNORMAL (ref 7.35–7.45)
PLATELET # BLD: 296 K/UL (ref 138–453)
PLATELET ESTIMATE: ABNORMAL
PMV BLD AUTO: 9.9 FL (ref 8.1–13.5)
PO2 ARTERIAL: 111.8 MMHG (ref 80–100)
PO2, ART, TEMP ADJ: ABNORMAL MMHG (ref 80–100)
POSITIVE BASE EXCESS, ART: ABNORMAL MMOL/L (ref 0–2)
POTASSIUM SERPL-SCNC: 3.9 MMOL/L (ref 3.7–5.3)
POTASSIUM SERPL-SCNC: 4.8 MMOL/L (ref 3.7–5.3)
PRO-BNP: 8271 PG/ML
PSV: ABNORMAL
PT. POSITION: ABNORMAL
RBC # BLD: 3.62 M/UL (ref 3.95–5.11)
RBC # BLD: ABNORMAL 10*6/UL
RESPIRATORY RATE: 33
SAMPLE SITE: ABNORMAL
SEG NEUTROPHILS: 67 % (ref 36–65)
SEGMENTED NEUTROPHILS ABSOLUTE COUNT: 5.79 K/UL (ref 1.5–8.1)
SET RATE: ABNORMAL
SODIUM BLD-SCNC: 126 MMOL/L (ref 135–144)
SODIUM BLD-SCNC: 127 MMOL/L (ref 135–144)
SPECIMEN DESCRIPTION: ABNORMAL
TEXT FOR RESPIRATORY: ABNORMAL
TOTAL HB: ABNORMAL G/DL (ref 12–16)
TOTAL PROTEIN: 6.9 G/DL (ref 6.4–8.3)
TOTAL RATE: ABNORMAL
TROPONIN INTERP: ABNORMAL
TROPONIN T: ABNORMAL NG/ML
TROPONIN, HIGH SENSITIVITY: 42 NG/L (ref 0–14)
TROPONIN, HIGH SENSITIVITY: 44 NG/L (ref 0–14)
TROPONIN, HIGH SENSITIVITY: 47 NG/L (ref 0–14)
VT: ABNORMAL
WBC # BLD: 8.6 K/UL (ref 3.5–11.3)
WBC # BLD: ABNORMAL 10*3/UL

## 2020-05-24 PROCEDURE — 94640 AIRWAY INHALATION TREATMENT: CPT

## 2020-05-24 PROCEDURE — 97166 OT EVAL MOD COMPLEX 45 MIN: CPT

## 2020-05-24 PROCEDURE — 80048 BASIC METABOLIC PNL TOTAL CA: CPT

## 2020-05-24 PROCEDURE — 83880 ASSAY OF NATRIURETIC PEPTIDE: CPT

## 2020-05-24 PROCEDURE — 2000000000 HC ICU R&B

## 2020-05-24 PROCEDURE — 73502 X-RAY EXAM HIP UNI 2-3 VIEWS: CPT

## 2020-05-24 PROCEDURE — 82805 BLOOD GASES W/O2 SATURATION: CPT

## 2020-05-24 PROCEDURE — 6360000002 HC RX W HCPCS

## 2020-05-24 PROCEDURE — 84484 ASSAY OF TROPONIN QUANT: CPT

## 2020-05-24 PROCEDURE — 85025 COMPLETE CBC W/AUTO DIFF WBC: CPT

## 2020-05-24 PROCEDURE — 94660 CPAP INITIATION&MGMT: CPT

## 2020-05-24 PROCEDURE — 80053 COMPREHEN METABOLIC PANEL: CPT

## 2020-05-24 PROCEDURE — 2580000003 HC RX 258: Performed by: EMERGENCY MEDICINE

## 2020-05-24 PROCEDURE — 6360000002 HC RX W HCPCS: Performed by: INTERNAL MEDICINE

## 2020-05-24 PROCEDURE — 71045 X-RAY EXAM CHEST 1 VIEW: CPT

## 2020-05-24 PROCEDURE — 99285 EMERGENCY DEPT VISIT HI MDM: CPT

## 2020-05-24 PROCEDURE — 93005 ELECTROCARDIOGRAM TRACING: CPT | Performed by: INTERNAL MEDICINE

## 2020-05-24 PROCEDURE — 83036 HEMOGLOBIN GLYCOSYLATED A1C: CPT

## 2020-05-24 PROCEDURE — 36415 COLL VENOUS BLD VENIPUNCTURE: CPT

## 2020-05-24 PROCEDURE — 6370000000 HC RX 637 (ALT 250 FOR IP): Performed by: INTERNAL MEDICINE

## 2020-05-24 PROCEDURE — 72125 CT NECK SPINE W/O DYE: CPT

## 2020-05-24 PROCEDURE — 82947 ASSAY GLUCOSE BLOOD QUANT: CPT

## 2020-05-24 PROCEDURE — 94664 DEMO&/EVAL PT USE INHALER: CPT

## 2020-05-24 PROCEDURE — 2580000003 HC RX 258: Performed by: INTERNAL MEDICINE

## 2020-05-24 PROCEDURE — 93010 ELECTROCARDIOGRAM REPORT: CPT | Performed by: INTERNAL MEDICINE

## 2020-05-24 PROCEDURE — 6370000000 HC RX 637 (ALT 250 FOR IP): Performed by: EMERGENCY MEDICINE

## 2020-05-24 PROCEDURE — 70450 CT HEAD/BRAIN W/O DYE: CPT

## 2020-05-24 RX ORDER — DEXTROSE MONOHYDRATE 25 G/50ML
12.5 INJECTION, SOLUTION INTRAVENOUS PRN
Status: DISCONTINUED | OUTPATIENT
Start: 2020-05-24 | End: 2020-05-25

## 2020-05-24 RX ORDER — AMLODIPINE BESYLATE 10 MG/1
10 TABLET ORAL DAILY
Status: DISCONTINUED | OUTPATIENT
Start: 2020-05-24 | End: 2020-05-25

## 2020-05-24 RX ORDER — 0.9 % SODIUM CHLORIDE 0.9 %
500 INTRAVENOUS SOLUTION INTRAVENOUS ONCE
Status: COMPLETED | OUTPATIENT
Start: 2020-05-24 | End: 2020-05-24

## 2020-05-24 RX ORDER — FAMOTIDINE 20 MG/1
20 TABLET, FILM COATED ORAL DAILY
Status: DISCONTINUED | OUTPATIENT
Start: 2020-05-24 | End: 2020-05-25

## 2020-05-24 RX ORDER — ALBUTEROL SULFATE 2.5 MG/3ML
2.5 SOLUTION RESPIRATORY (INHALATION) 3 TIMES DAILY
Status: DISCONTINUED | OUTPATIENT
Start: 2020-05-24 | End: 2020-05-26

## 2020-05-24 RX ORDER — ACETAMINOPHEN 325 MG/1
650 TABLET ORAL EVERY 6 HOURS PRN
Status: DISCONTINUED | OUTPATIENT
Start: 2020-05-24 | End: 2020-05-26 | Stop reason: HOSPADM

## 2020-05-24 RX ORDER — ATORVASTATIN CALCIUM 40 MG/1
40 TABLET, FILM COATED ORAL DAILY
Status: DISCONTINUED | OUTPATIENT
Start: 2020-05-24 | End: 2020-05-25

## 2020-05-24 RX ORDER — PROMETHAZINE HYDROCHLORIDE 25 MG/1
12.5 TABLET ORAL EVERY 6 HOURS PRN
Status: DISCONTINUED | OUTPATIENT
Start: 2020-05-24 | End: 2020-05-26 | Stop reason: HOSPADM

## 2020-05-24 RX ORDER — NICOTINE POLACRILEX 4 MG
15 LOZENGE BUCCAL PRN
Status: DISCONTINUED | OUTPATIENT
Start: 2020-05-24 | End: 2020-05-25

## 2020-05-24 RX ORDER — DEXTROSE MONOHYDRATE 50 MG/ML
100 INJECTION, SOLUTION INTRAVENOUS PRN
Status: DISCONTINUED | OUTPATIENT
Start: 2020-05-24 | End: 2020-05-25

## 2020-05-24 RX ORDER — ACETAMINOPHEN 325 MG/1
650 TABLET ORAL EVERY 6 HOURS PRN
Status: DISCONTINUED | OUTPATIENT
Start: 2020-05-24 | End: 2020-05-24

## 2020-05-24 RX ORDER — SODIUM CHLORIDE 9 MG/ML
INJECTION, SOLUTION INTRAVENOUS CONTINUOUS
Status: DISCONTINUED | OUTPATIENT
Start: 2020-05-24 | End: 2020-05-24

## 2020-05-24 RX ORDER — SODIUM CHLORIDE 0.9 % (FLUSH) 0.9 %
10 SYRINGE (ML) INJECTION PRN
Status: DISCONTINUED | OUTPATIENT
Start: 2020-05-24 | End: 2020-05-26 | Stop reason: HOSPADM

## 2020-05-24 RX ORDER — FUROSEMIDE 10 MG/ML
40 INJECTION INTRAMUSCULAR; INTRAVENOUS DAILY
Status: DISCONTINUED | OUTPATIENT
Start: 2020-05-24 | End: 2020-05-25

## 2020-05-24 RX ORDER — ASPIRIN 325 MG
325 TABLET ORAL ONCE
Status: COMPLETED | OUTPATIENT
Start: 2020-05-24 | End: 2020-05-24

## 2020-05-24 RX ORDER — METOPROLOL TARTRATE 100 MG/1
100 TABLET ORAL 2 TIMES DAILY
Status: DISCONTINUED | OUTPATIENT
Start: 2020-05-24 | End: 2020-05-25

## 2020-05-24 RX ORDER — POLYETHYLENE GLYCOL 3350 17 G/17G
17 POWDER, FOR SOLUTION ORAL DAILY PRN
Status: DISCONTINUED | OUTPATIENT
Start: 2020-05-24 | End: 2020-05-26 | Stop reason: HOSPADM

## 2020-05-24 RX ORDER — LORAZEPAM 2 MG/ML
0.5 INJECTION INTRAMUSCULAR EVERY 4 HOURS PRN
Status: DISCONTINUED | OUTPATIENT
Start: 2020-05-24 | End: 2020-05-25

## 2020-05-24 RX ORDER — PANTOPRAZOLE SODIUM 20 MG/1
40 TABLET, DELAYED RELEASE ORAL DAILY
Status: DISCONTINUED | OUTPATIENT
Start: 2020-05-24 | End: 2020-05-25

## 2020-05-24 RX ORDER — ALBUTEROL SULFATE 2.5 MG/3ML
2.5 SOLUTION RESPIRATORY (INHALATION) EVERY 6 HOURS PRN
Status: DISCONTINUED | OUTPATIENT
Start: 2020-05-24 | End: 2020-05-26

## 2020-05-24 RX ORDER — IBUPROFEN 600 MG/1
600 TABLET ORAL ONCE
Status: COMPLETED | OUTPATIENT
Start: 2020-05-24 | End: 2020-05-24

## 2020-05-24 RX ORDER — ACETAMINOPHEN 650 MG/1
650 SUPPOSITORY RECTAL EVERY 6 HOURS PRN
Status: DISCONTINUED | OUTPATIENT
Start: 2020-05-24 | End: 2020-05-26 | Stop reason: HOSPADM

## 2020-05-24 RX ORDER — ONDANSETRON 2 MG/ML
4 INJECTION INTRAMUSCULAR; INTRAVENOUS EVERY 6 HOURS PRN
Status: DISCONTINUED | OUTPATIENT
Start: 2020-05-24 | End: 2020-05-26 | Stop reason: HOSPADM

## 2020-05-24 RX ORDER — ALBUTEROL SULFATE 2.5 MG/3ML
SOLUTION RESPIRATORY (INHALATION)
Status: COMPLETED
Start: 2020-05-24 | End: 2020-05-24

## 2020-05-24 RX ORDER — ACETAMINOPHEN 325 MG/1
650 TABLET ORAL ONCE
Status: COMPLETED | OUTPATIENT
Start: 2020-05-24 | End: 2020-05-24

## 2020-05-24 RX ORDER — ASPIRIN 81 MG/1
81 TABLET ORAL DAILY
Status: DISCONTINUED | OUTPATIENT
Start: 2020-05-24 | End: 2020-05-25

## 2020-05-24 RX ORDER — SULFAMETHOXAZOLE AND TRIMETHOPRIM 400; 80 MG/1; MG/1
1 TABLET ORAL 2 TIMES DAILY
Status: DISCONTINUED | OUTPATIENT
Start: 2020-05-24 | End: 2020-05-25

## 2020-05-24 RX ORDER — TROSPIUM CHLORIDE 20 MG/1
20 TABLET, FILM COATED ORAL
Status: DISCONTINUED | OUTPATIENT
Start: 2020-05-24 | End: 2020-05-25

## 2020-05-24 RX ORDER — SODIUM CHLORIDE 0.9 % (FLUSH) 0.9 %
10 SYRINGE (ML) INJECTION EVERY 12 HOURS SCHEDULED
Status: DISCONTINUED | OUTPATIENT
Start: 2020-05-24 | End: 2020-05-26 | Stop reason: HOSPADM

## 2020-05-24 RX ADMIN — ALBUTEROL SULFATE 2.5 MG: 2.5 SOLUTION RESPIRATORY (INHALATION) at 12:28

## 2020-05-24 RX ADMIN — AMLODIPINE BESYLATE 10 MG: 10 TABLET ORAL at 08:46

## 2020-05-24 RX ADMIN — METOPROLOL TARTRATE 100 MG: 100 TABLET ORAL at 21:25

## 2020-05-24 RX ADMIN — SULFAMETHOXAZOLE AND TRIMETHOPRIM 1 TABLET: 400; 80 TABLET ORAL at 21:24

## 2020-05-24 RX ADMIN — Medication 10 ML: at 14:23

## 2020-05-24 RX ADMIN — ASPIRIN 81 MG: 81 TABLET, COATED ORAL at 08:46

## 2020-05-24 RX ADMIN — ALBUTEROL SULFATE 2.5 MG: 2.5 SOLUTION RESPIRATORY (INHALATION) at 20:22

## 2020-05-24 RX ADMIN — LORAZEPAM 0.5 MG: 2 INJECTION INTRAMUSCULAR; INTRAVENOUS at 15:26

## 2020-05-24 RX ADMIN — PANTOPRAZOLE SODIUM 40 MG: 20 TABLET, DELAYED RELEASE ORAL at 08:46

## 2020-05-24 RX ADMIN — METOPROLOL TARTRATE 100 MG: 100 TABLET ORAL at 08:46

## 2020-05-24 RX ADMIN — SULFAMETHOXAZOLE AND TRIMETHOPRIM 1 TABLET: 400; 80 TABLET ORAL at 08:46

## 2020-05-24 RX ADMIN — INSULIN LISPRO 3 UNITS: 100 INJECTION, SOLUTION INTRAVENOUS; SUBCUTANEOUS at 16:35

## 2020-05-24 RX ADMIN — ENOXAPARIN SODIUM 30 MG: 30 INJECTION SUBCUTANEOUS at 08:46

## 2020-05-24 RX ADMIN — SODIUM CHLORIDE 500 ML: 9 INJECTION, SOLUTION INTRAVENOUS at 04:37

## 2020-05-24 RX ADMIN — IBUPROFEN 600 MG: 600 TABLET, FILM COATED ORAL at 04:29

## 2020-05-24 RX ADMIN — DEXTROSE 15 G: 15 GEL ORAL at 07:22

## 2020-05-24 RX ADMIN — LORAZEPAM 0.5 MG: 2 INJECTION INTRAMUSCULAR; INTRAVENOUS at 19:32

## 2020-05-24 RX ADMIN — ATORVASTATIN CALCIUM 40 MG: 40 TABLET, FILM COATED ORAL at 13:34

## 2020-05-24 RX ADMIN — SODIUM CHLORIDE: 9 INJECTION, SOLUTION INTRAVENOUS at 05:39

## 2020-05-24 RX ADMIN — SODIUM CHLORIDE, PRESERVATIVE FREE 10 ML: 5 INJECTION INTRAVENOUS at 19:30

## 2020-05-24 RX ADMIN — ASPIRIN 325 MG: 325 TABLET, FILM COATED ORAL at 13:34

## 2020-05-24 RX ADMIN — ONDANSETRON 4 MG: 2 INJECTION INTRAMUSCULAR; INTRAVENOUS at 12:16

## 2020-05-24 RX ADMIN — Medication 10 ML: at 15:26

## 2020-05-24 RX ADMIN — ACETAMINOPHEN 650 MG: 325 TABLET, FILM COATED ORAL at 23:59

## 2020-05-24 RX ADMIN — ACETAMINOPHEN 650 MG: 325 TABLET, FILM COATED ORAL at 02:25

## 2020-05-24 RX ADMIN — FAMOTIDINE 20 MG: 20 TABLET ORAL at 08:46

## 2020-05-24 RX ADMIN — ALBUTEROL SULFATE 2.5 MG: 2.5 SOLUTION RESPIRATORY (INHALATION) at 16:06

## 2020-05-24 RX ADMIN — FUROSEMIDE 40 MG: 10 INJECTION, SOLUTION INTRAMUSCULAR; INTRAVENOUS at 14:22

## 2020-05-24 RX ADMIN — TROSPIUM CHLORIDE 20 MG: 20 TABLET, FILM COATED ORAL at 19:20

## 2020-05-24 ASSESSMENT — PAIN SCALES - GENERAL
PAINLEVEL_OUTOF10: 0
PAINLEVEL_OUTOF10: 0
PAINLEVEL_OUTOF10: 5
PAINLEVEL_OUTOF10: 0
PAINLEVEL_OUTOF10: 5
PAINLEVEL_OUTOF10: 6

## 2020-05-24 ASSESSMENT — PAIN DESCRIPTION - ORIENTATION
ORIENTATION: LOWER
ORIENTATION: RIGHT

## 2020-05-24 ASSESSMENT — PAIN DESCRIPTION - PAIN TYPE
TYPE: CHRONIC PAIN
TYPE: CHRONIC PAIN

## 2020-05-24 ASSESSMENT — ENCOUNTER SYMPTOMS
NAUSEA: 0
WHEEZING: 0
COLOR CHANGE: 0
SHORTNESS OF BREATH: 0
ABDOMINAL PAIN: 0
VOMITING: 0
RHINORRHEA: 0

## 2020-05-24 ASSESSMENT — PAIN DESCRIPTION - DESCRIPTORS
DESCRIPTORS: ACHING
DESCRIPTORS: ACHING

## 2020-05-24 ASSESSMENT — PAIN DESCRIPTION - LOCATION
LOCATION: BACK
LOCATION: HIP

## 2020-05-24 NOTE — PROGRESS NOTES
Patient assessed and vitals obtained at this time. Pt is alert and oriented x4. Respirations are unlabored and clear upon auscultation. Pt assisted with the bedpan. Hailey care provided. FSBS obtained and found to be 66 at 0720. Per protocol, 1 tube of PRN glucose gel given at 0722. Pt educated on the importance of eating the entire tube. Pt verbalizes understanding. Will be back in to recheck FSBS 15 mins after tube is gone. Will continue to monitor.

## 2020-05-24 NOTE — PROGRESS NOTES
Occupational Therapy   Occupational Therapy Initial Assessment  Date: 2020   Patient Name: Augusta Espinosa  MRN: [id number]     : 1935    Date of Service: 2020    Discharge Recommendations:  Continue to assess pending progress, Subacute/Skilled Nursing Facility, IP Rehab       Assessment   Performance deficits / Impairments: Decreased functional mobility ; Decreased ADL status; Decreased endurance;Decreased strength  Assessment: Patient is a 82 y/o female admitted due to fall and hypoglycemia. Patient presents with decreased strength/endurance, limiting overall ADL participation. No functional transfers completed due to patient refusal secondary to feeling ill. Patient to benefit from OT services in order to address these deficits. Prognosis: Fair  Decision Making: Medium Complexity  OT Education: OT Role;Plan of Care  Barriers to Learning: will require increased education  REQUIRES OT FOLLOW UP: Yes  Activity Tolerance  Activity Tolerance: Patient limited by fatigue  Safety Devices  Safety Devices in place: Yes  Type of devices: Left in bed;Nurse notified; All fall risk precautions in place           Patient Diagnosis(es): The primary encounter diagnosis was Hypoglycemia. Diagnoses of DOMENICO (acute kidney injury) ([facility] [address] and Hyponatremia were also pertinent to this visit. has a past medical history of Arthritis, CAD (coronary artery disease), Clinical trial participant at discharge, Diabetes mellitus ([facility] [address] GERD (gastroesophageal reflux disease), H/O cardiovascular stress test, History of non-ST elevation myocardial infarction (NSTEMI), Hyperlipidemia, Hypertension, and Unspecified cerebral artery occlusion with cerebral infarction. has a past surgical history that includes Leg Surgery (Bilateral); Cholecystectomy; Colonoscopy; eye surgery; Tonsillectomy; Toe Surgery (Right, 8/21/15); Coronary angioplasty with stent (); and Coronary angioplasty with stent (2018). Restrictions  Restrictions/Precautions  Restrictions/Precautions: General Precautions    Subjective   General  Chart Reviewed: Yes  Patient assessed for rehabilitation services?: Yes  Family / Caregiver Present: No  Referring Practitioner: Dr. Justino Cochran  Diagnosis: Falls/Hypoglycemia  Subjective  Subjective: Patient lying sideways in bed upon arrival, stating \" I just dont feel good. \", however agereable to complete minimal activities in bed. Vital Signs  Temp: 97 °F (36.1 °C)  Temp Source: Temporal  Pulse: 75  Heart Rate Source: Apical  Resp: 30  BP: 125/69  BP Location: Left lower arm  BP Upper/Lower: Lower  MAP (mmHg): 87  Patient Position: Semi fowlers  Level of Consciousness: Alert  MEWS Score: 3  Oxygen Therapy  SpO2: (!) 85 %  Pulse Oximeter Device Mode: Intermittent  Pulse Oximeter Device Location: Right;Finger  O2 Device: None (Room air)  Social/Functional History  Social/Functional History  Lives With: Spouse  Type of Home: House  Home Layout: One level  Home Access: Level entry(per patient report, no stairs)  Home Equipment: Rolling walker  ADL Assistance: Needs assistance  Homemaking Assistance: Needs assistance  Ambulation Assistance: Independent  Transfer Assistance: Needs assistance  Active : No  Additional Comments: Typically needs some assistance with ADLs. Patient states  will help her. Asked about shower setup, however patient did not answer, very lethargic and kept stating she did not feel well. Objective   Vision: Within Functional Limits  Hearing: Within functional limits       Observation/Palpation  Observation: Patient noted to be sideways in bed upon entry, assisted with repositioning, patient requested covers to be taken off, stating, \"im burning up and do not feel good. \" Patient O2 stats checked, initially at 80, reported to RN. With cuing and encouragement, patient completed deep breathing and O2 stats returned to 95, pulse ox left on patient finger.  Reported to aide and RN regarding patient stating she did not feel well. .     ADL  Feeding: Setup  Grooming: Minimal assistance  UE Bathing: Moderate assistance  LE Bathing: Maximum assistance  UE Dressing: Moderate assistance  LE Dressing: Maximum assistance  Toileting: Maximum assistance        Bed mobility  Rolling to Left: Moderate assistance  Rolling to Right: Moderate assistance  Scooting: Maximal assistance  Comment: Patient required consistent cuing and encouragement to assist with repositioning in bed. Transfers  Sit to stand: Unable to assess  Stand to sit: Unable to assess  Transfer Comments: Patient refused, not appropriate for transfers at this time. LUE AROM (degrees)  LUE AROM : WFL  Left Hand AROM (degrees)  Left Hand AROM: WFL  RUE AROM (degrees)  RUE AROM : WFL  Right Hand AROM (degrees)  Right Hand AROM: WFL                      Plan   Plan  Times per week: 7  Times per day: Daily  Current Treatment Recommendations: Strengthening, Safety Education & Training, Self-Care / ADL, Functional Mobility Training, Neuromuscular Re-education, Endurance Training    AM-PAC Score        -[facility] Inpatient Daily Activity Raw Score: 14 ([facility]/24/20 [facility])  AM-PAC Inpatient ADL T-Scale Score : 33.39 (05/24/20 [facility])  ADL Inpatient CMS 0-100% Score: 59.67 (05/24/20 [facility] hospital)  ADL Inpatient CMS G-Code Modifier : CK (05/24/20 [facility])    Goals  Short term goals  Time Frame for Short term goals: 10 days  Short term goal 1: Patient to progress to functional transfers with mod A. Short term goal 2: Patient to complete ADL grooming tasks with setup. Short term goal 3: Patient to complete 10 minutes ther-ex/ther-act in order to improve strength/endurance for ADLs.         Therapy Time   Individual Concurrent Group Co-treatment   Time In 1051         Time Out 1103         Minutes 12                 Neftali Norton OTR/L

## 2020-05-24 NOTE — PROGRESS NOTES
Spoke with pharmacy, they recommend lowering Bactrim dose d/t renal function. Will pass along to day shift RN.

## 2020-05-24 NOTE — PROGRESS NOTES
This writer called and spoke with Cornel Burris RN who is caring for patient on [facility] and gave her updated report on patient's urine Culture and Sensitivity. Cornel Burris states that patient is still taking Bactrim 800/160 and this writer did advise that C&S states that there is a sensitivity to Bactrim.

## 2020-05-24 NOTE — PROGRESS NOTES
Ativan given as per prn order. Patient allows bippap to be used. SAO2 90% with fio2 at 40%. Will monitor and adjust fio2 as needed to maintain adequate oxygen levels.

## 2020-05-24 NOTE — PROGRESS NOTES
ICU addendum    Pt non-cooperative with BiPAP and heated high flow O2 due to claustrophobia.   Will add Ativan PRN    Repeat EKG reviewed - lateral ST depressions are resolved  Troponin 44, 47  CXR: pulmonary edema / vascular congestion      Impression:   Acute respiratory failure of unknown etiology    Plan:  Repeat troponin and EKG in 2 hrs  Ativan for mask compliance   Lasix 40 IV x 1 due to congestion noted on CXR    Estephanie Oh MD

## 2020-05-24 NOTE — PROGRESS NOTES
Pharmacy Note     Renal Dose Adjustment    Lan Aguilar is a 81 y.o. female. Pharmacist assessment of renally cleared medications. Recent Labs     05/22/20  0630 05/24/20  0125   BUN 13 13       Recent Labs     05/22/20  0630 05/24/20  0125   CREATININE 1.38* 1.79*       Estimated Creatinine Clearance: 24 mL/min (A) (based on SCr of 1.79 mg/dL (H)). Height:   Ht Readings from Last 1 Encounters:   05/24/20 5' 5\" (1.651 m)     Weight:  Wt Readings from Last 1 Encounters:   05/24/20 168 lb 11.2 oz (76.5 kg)       The following medication dose has been adjusted based upon renal function per P&T Guidelines:             Pepcid dose reduced to 20 mg once daily. Lovenox dose reduced to 30 mg once daily. Bactrim dose reduced to 1 single strength tab BID. Thank you,  VASU Campos, PharmD  5/24/2020 5:53 AM

## 2020-05-24 NOTE — PROGRESS NOTES
[facility]  Physical Therapy    Date: 2020  Patient Name: Darlyn Alfaro        : 1935       [] Pt Refusal           [x] Pt Unavailable due to: Went to see patient in room for PT evaluation. Patient has been sent to ICU and nurse indicated not appropriate for PT evaluation at this time.         Alexandra Leal Date: 2020

## 2020-05-24 NOTE — PROGRESS NOTES
Checked back in on patient to repeat FSBS and patient was asleep. Pt awakes easily and asked about glucose gel. Pt has  Consumed roughly half of it. Pt educated on the need of receiving the whole tube but patient states \"I've had all I can. \" FSBS now 61. Pt encouraged to eat the rest of the glucose gel tube. Pt agreeable. Breakfast is on the unit and on its way to the patient.

## 2020-05-24 NOTE — H&P
Kamla Yan M.D. Internal Medicine History and Physical 5/24/20    Patient:  Taiwo Pabon  MRN: [id number]    Chief Complaint:    Chief Complaint   Patient presents with    Hypoglycemia     sugar 55 at home unresponsive squad called per        History Obtained From:  patient, electronic medical record  PCP: Awilda Butcher MD    History of Present Illness: The patient is a 81 y.o. female who was recently admitted for hypertensive urgency and urinary retention from 4/27 - 4/28/2020. She was recently released from [facility] for which she was there for the same reasons. She was DC'd back to [facility] on 4/28/2020. She came to the ER on 5/22/2020 with complaint of hypoglycemia and confusion. BS level was in the 50's per EMS and 38 in the ER. She was treated with Dextrose infusion and her mental status and BS improved. She was DC'd but her glipizide was not DC'd. She returned to the ER yesterday again with confusion and hypoglycemia. Her glucose was 50's prior to arrival and 71 in ER. BS improved to 135 with dextrose infusion at 0300 but was back down to 61 at 0800 this morning. Also, her creatinine was bumped to 1.79 (previously 1.38 on 5/22/20) and her Na+ was down from 129 on 5/22 to 126 on admission. She was admitted to [facility] for hypoglycemia, DOMENICO and hyponatremia. She was stable until about 11:30 AM today when she became tachypneic with RR in the 30's to 40's and hypoxic with SpO2 dropping to 85% (was % prior). She was transferred to ICU. State EKG shows inferior ST elevation and lateral ST depressions. Previous EKG had the inferior elevations but not the lateral depressions. Stat troponin, ABG and CXR ordered. She denies chest pain but complains of right lower quadrant pain. She is acutely short of breath in mild-moderate respiratory distress.       Past Medical History:        Diagnosis Date    Arthritis     CAD (coronary artery disease)     s/p stent placements in 2002 and 2018   

## 2020-05-24 NOTE — PROGRESS NOTES
RESPIRATORY ASSESSMENT PROTOCOL                                                                                              Patient Name: Aretha Holcomb Room#: C606/[id number] : 1935     Admitting diagnosis: Hypoglycemia [E16.2]       Medical History:   Past Medical History:   Diagnosis Date    Arthritis     CAD (coronary artery disease)     s/p stent placements in  and     Clinical trial participant at discharge 2018     disqualified    Diabetes mellitus ([address]     GERD (gastroesophageal reflux disease)     H/O cardiovascular stress test 2017    NORMAL    History of non-ST elevation myocardial infarction (NSTEMI) 2018    s/p JAYA to LAD    Hyperlipidemia     Hypertension     Unspecified cerebral artery occlusion with cerebral infarction        PATIENT ASSESSMENT    LABORATORY DATA  Hematology:   Lab Results   Component Value Date    WBC 8.6 2020    RBC 3.62 2020    HGB 11.2 2020    HCT 35.0 2020     2020     Chemistry:    Lab Results   Component Value Date    PHART 7.171 2020    STW5KFD 45.5 2020    PO2ART 111.8 2020    X1FZGTWD 96.9 2020    ULD4WGK 16.3 2020    PBEA NOT REPORTED 2020       Blood Culture:   Sputum Culture:     VITALS  Pulse: 73   Resp: 20  BP: (!) 169/91  SpO2: 93 % O2 Device: PAP (positive airway pressure)  Temp: 97.4 °F (36.3 °C)  Comment:   SKIN COLOR  [x] Normal  [] Pale  [] Dusky  [] Cyanotic      RESPIRATORY PATTERN  [x] Normal  [] Dyspnea  [] Cheyne-Whalen  [] Kussmaul  [] Biots  AMBULATORY  [] Yes  [x] No  [] With Assistance    PEAK FLOW  Predicted:     Personal Best:        VITAL CAPACITY  Predicted value:  ml  Actual Value:  ml  30% of Predicted:  ml  Patient Acuity 0 1 2 3 4 Score   Level of Concious (LOC) [x]  Alert & Oriented or Pt normal LOC []  Confused;follows directions []  Confused & uncooper-ative []  Obtunded []  Comatose 0   Respiratory Rate  (RR) []  Reg. rate above in the following:    [] PULMONARY HISTORY (PULM HX)    Goal: Assist patient in quitting smoking to slow or stop the progression of lung disease.     [] Smoking Cessation Protocol    SMOKING CESSATION EDUCATION provided according to policy RY_094: (oliva with an X)  ____Yes    ____ No     ____ NA    Smoking Cessation Booklet given:  ____Yes  ____No ____Patient Ilya Ho

## 2020-05-24 NOTE — ED PROVIDER NOTES
[facility] AT THE [facility] MED SURG  Emergency Department Encounter  EmergencyMedicine Attending     Pt Name:Rosa Elena Garcia  MRN: [id number]  [place] 1935  Date of evaluation: 5/24/20  PCP:  Neto Larios MD    CHIEF COMPLAINT       Chief Complaint   Patient presents with    Hypoglycemia     sugar 55 at home unresponsive squad called per        HISTORY OF PRESENT ILLNESS  (Location/Symptom, Timing/Onset, Context/Setting, Quality, Duration, Modifying Factors, Severity.)      Jessie Thapa is a 81 y.o. female who presents with a fall and hypoglycemia. Patient believes that she rolled out of bed and fell and was found unresponsive by her . At home she was unresponsive and her blood sugar was 55 with EMS squad. Patient is here in the emergency room the second time for hypoglycemia in the last 48 hours. She was here on the 22nd where her glucose was as low as 28. Patient denies any headache, neck pain, back pain. Denies any abdominal pain or chest pain however is complaining of her right-sided hip pain. Says that she has chronic right-sided hip pain that is unchanged from her baseline. Cannot ambulate at baseline either. Patient says that she is on medicines for her diabetes, denies taking any insulin. But says that she has been taking her medications. Per chart review it appears that the patient is on glipizide. PAST MEDICAL / SURGICAL / SOCIAL / FAMILY HISTORY      has a past medical history of Arthritis, CAD (coronary artery disease), Clinical trial participant at discharge, Diabetes mellitus ([facility] [address] GERD (gastroesophageal reflux disease), H/O cardiovascular stress test, History of non-ST elevation myocardial infarction (NSTEMI), Hyperlipidemia, Hypertension, and Unspecified cerebral artery occlusion with cerebral infarction. has a past surgical history that includes Leg Surgery (Bilateral); Cholecystectomy; Colonoscopy; eye surgery; Tonsillectomy;  Toe Surgery (Right, 8/21/15); Pulmonary:      Effort: Pulmonary effort is normal. No respiratory distress. Breath sounds: Normal breath sounds. No wheezing or rales. Abdominal:      General: There is no distension. Palpations: Abdomen is soft. Tenderness: There is no abdominal tenderness. There is no guarding or rebound. Comments: No abdominal tenderness on examination. Musculoskeletal:         General: Tenderness present. Comments: Mild tenderness to palpation over the right hip. Skin:     General: Skin is warm. Findings: No erythema. Neurological:      Mental Status: She is alert. Psychiatric:         Mood and Affect: Mood normal.         Thought Content: Thought content normal.         DIFFERENTIAL  DIAGNOSIS     PLAN (LABS / IMAGING / EKG):  Orders Placed This Encounter   Procedures    CT Head WO Contrast    CT Cervical Spine WO Contrast    XR HIP 2-3 VW W PELVIS RIGHT    Glucose, Whole Blood    CBC Auto Differential    Comprehensive Metabolic Panel    Glucose, Whole Blood    Brain Natriuretic Peptide    CBC auto differential    Hemoglobin A1c    Basic Metabolic Panel w/ Reflex to MG    DIET GENERAL; Carb Control: 5 carb choices (75 gms)/meal; Daily Fluid Restriction: 1800 ml    Vital signs per unit routine    Up with assistance    Daily weights    Intake and output    Tobacco cessation education    HYPOGLYCEMIA TREATMENT: blood glucose less than 50 mg/dL and patient  ALERT and TOLERATING PO    HYPOGLYCEMIA TREATMENT: blood glucose less than 70 mg/dL and patient ALERT and TOLERATING PO    HYPOGLYCEMIA TREATMENT: blood glucose less than 70 mg/dL and patient NOT ALERT or NPO    Notify physician    Notify Physician    Elevate heels off of bed at all times if patient is not able to move lower extremities    Turn or assist with turn every 2 hours if patient is unable to turn self. Remind patient to turn if necessary.     Inspect skin per unit guidelines    Maintain HOB at the lowest elevation consistent with medical plan of care    Full Code    Inpatient consult to Case Management    OT eval and treat    PT evaluation and treat    Initiate Oxygen Therapy Protocol    POCT glucose    POCT glucose    POCT glucose    POCT Glucose    PATIENT STATUS (FROM ED OR OR/PROCEDURAL) Inpatient       MEDICATIONS ORDERED:  Orders Placed This Encounter   Medications    acetaminophen (TYLENOL) 325 MG tablet     Foster Primrose: cabinet override    acetaminophen (TYLENOL) tablet 650 mg    0.9 % sodium chloride bolus    ibuprofen (ADVIL;MOTRIN) tablet 600 mg    aspirin EC tablet 81 mg    metFORMIN (GLUCOPHAGE) tablet 1,000 mg    amLODIPine (NORVASC) tablet 10 mg    DISCONTD: acetaminophen (TYLENOL) tablet 650 mg    sulfamethoxazole-trimethoprim (BACTRIM;SEPTRA) 400-80 MG per tablet 1 tablet    pantoprazole (PROTONIX) tablet 40 mg    metoprolol (LOPRESSOR) tablet 100 mg    trospium (SANCTURA) tablet 20 mg    0.9 % sodium chloride infusion    sodium chloride flush 0.9 % injection 10 mL    sodium chloride flush 0.9 % injection 10 mL    OR Linked Order Group     acetaminophen (TYLENOL) tablet 650 mg     acetaminophen (TYLENOL) suppository 650 mg    polyethylene glycol (GLYCOLAX) packet 17 g    OR Linked Order Group     promethazine (PHENERGAN) tablet 12.5 mg     ondansetron (ZOFRAN) injection 4 mg    famotidine (PEPCID) tablet 20 mg    enoxaparin (LOVENOX) injection 30 mg    glucose (GLUTOSE) 40 % oral gel 15 g    dextrose 50 % IV solution    glucagon (rDNA) injection 1 mg    dextrose 5 % solution    insulin lispro (HUMALOG) injection vial 0-6 Units    insulin lispro (HUMALOG) injection vial 0-3 Units       DDX: Hypoglycemia versus DOMENICO versus DKA versus hyponatremia versus dehydration    DIAGNOSTIC RESULTS / EMERGENCY DEPARTMENT COURSE / MDM   :  Results for orders placed or performed during the hospital encounter of 05/24/20   Glucose, Whole Blood   Result Value Ref chart in the absence of a cardiologist.    EMERGENCY DEPARTMENT COURSE:    Discussed with Dr. Art Yuen, patient will be admitted to his service. PROCEDURES:  None    CONSULTS:  IP CONSULT TO CASE MANAGEMENT    CRITICAL CARE:  None    FINAL IMPRESSION      1. Hypoglycemia    2. DOMENICO (acute kidney injury) ([facility] [address]    3.  Hyponatremia          DISPOSITION / PLAN     DISPOSITION Admitted 05/24/2020 04:18:16 AM      PATIENT REFERRED TO:  Catherine Rojo MD  [facility] [address] [place], [place] [address] [phone]            DISCHARGE MEDICATIONS:  Current Discharge Medication List          Chacho Foss MD  Emergency Medicine Attending    (Please note that portions of thisnote were completed with a voice recognition program.  Efforts were made to edit the dictations but occasionally words are mis-transcribed.)        Chacho Foss MD  05/24/20 2039

## 2020-05-24 NOTE — PLAN OF CARE
Problem: Pain:  Goal: Pain level will decrease  Description: Pain level will decrease  Outcome: Ongoing  Note: Monitoring pain and discomfort. Non-pharmaceutical pain control measures encouraged to reduce pain/discomfort. Physician will be notified if medications ordered are not effective in reducing/alleviating pain. Will continue to monitor and medicate as ordered. Alis Mcmanus RN   Goal: Control of acute pain  Description: Control of acute pain  Outcome: Ongoing  Note: Pain assessed every four hours and as needed using 0-10 pain scale. Pt educated on scale and uses scale appropriately. Encourage pt to notify staff of pain before pain becomes uncontrollable. Correlate periods of heavy activity after pain medication administration. Use pharmacological and non pharmacological methods for pain relief such as: warm blankets, ice, television, reading, or rest.       Problem: Falls - Risk of:  Goal: Will remain free from falls  Description: Will remain free from falls  Outcome: Ongoing  Note: Call light in reach at all times, frequent checks, bed in lowest position, wheels of bed and chair locked, non skid footwear on, appropriate transfer techniques, personal items within reach, walkways free of obstructions, fall risk armband and sign displayed, Bradford fall risk score per protocol. No falls this shift, will continue to monitor. Problem: Serum Glucose Level - Abnormal:  Goal: Ability to maintain appropriate glucose levels has stabilized  Description: Ability to maintain appropriate glucose levels has stabilized  Outcome: Ongoing  Note: Patient FSBS was 66 then 61 this morning. After glucose gel and eating breakfast, FSBS raised to 128.  FSBS being checked AC and HS and PRN

## 2020-05-25 LAB
ABSOLUTE EOS #: 0.07 K/UL (ref 0–0.44)
ABSOLUTE IMMATURE GRANULOCYTE: 0.05 K/UL (ref 0–0.3)
ABSOLUTE LYMPH #: 1.24 K/UL (ref 1.1–3.7)
ABSOLUTE MONO #: 1.34 K/UL (ref 0.1–1.2)
ANION GAP SERPL CALCULATED.3IONS-SCNC: 12 MMOL/L (ref 9–17)
BASOPHILS # BLD: 0 % (ref 0–2)
BASOPHILS ABSOLUTE: 0.05 K/UL (ref 0–0.2)
BUN BLDV-MCNC: 13 MG/DL (ref 8–23)
BUN/CREAT BLD: 8 (ref 9–20)
CALCIUM SERPL-MCNC: 8.6 MG/DL (ref 8.6–10.4)
CHLORIDE BLD-SCNC: 94 MMOL/L (ref 98–107)
CO2: 19 MMOL/L (ref 20–31)
CREAT SERPL-MCNC: 1.73 MG/DL (ref 0.5–0.9)
DIFFERENTIAL TYPE: ABNORMAL
EKG ATRIAL RATE: 72 BPM
EKG ATRIAL RATE: 97 BPM
EKG P AXIS: 29 DEGREES
EKG P AXIS: 8 DEGREES
EKG P-R INTERVAL: 146 MS
EKG P-R INTERVAL: 196 MS
EKG Q-T INTERVAL: 376 MS
EKG Q-T INTERVAL: 416 MS
EKG QRS DURATION: 110 MS
EKG QRS DURATION: 122 MS
EKG QTC CALCULATION (BAZETT): 455 MS
EKG QTC CALCULATION (BAZETT): 477 MS
EKG R AXIS: 20 DEGREES
EKG R AXIS: 55 DEGREES
EKG T AXIS: 37 DEGREES
EKG T AXIS: 93 DEGREES
EKG VENTRICULAR RATE: 72 BPM
EKG VENTRICULAR RATE: 97 BPM
EOSINOPHILS RELATIVE PERCENT: 1 % (ref 1–4)
GFR AFRICAN AMERICAN: 34 ML/MIN
GFR NON-AFRICAN AMERICAN: 28 ML/MIN
GFR SERPL CREATININE-BSD FRML MDRD: ABNORMAL ML/MIN/{1.73_M2}
GFR SERPL CREATININE-BSD FRML MDRD: ABNORMAL ML/MIN/{1.73_M2}
GLUCOSE BLD-MCNC: 190 MG/DL (ref 70–99)
HCT VFR BLD CALC: 33.4 % (ref 36.3–47.1)
HEMOGLOBIN: 10.8 G/DL (ref 11.9–15.1)
IMMATURE GRANULOCYTES: 0 %
LYMPHOCYTES # BLD: 9 % (ref 24–43)
MCH RBC QN AUTO: 31.1 PG (ref 25.2–33.5)
MCHC RBC AUTO-ENTMCNC: 32.3 G/DL (ref 28.4–34.8)
MCV RBC AUTO: 96.3 FL (ref 82.6–102.9)
MONOCYTES # BLD: 9 % (ref 3–12)
NRBC AUTOMATED: 0 PER 100 WBC
PDW BLD-RTO: 13.4 % (ref 11.8–14.4)
PLATELET # BLD: 326 K/UL (ref 138–453)
PLATELET ESTIMATE: ABNORMAL
PMV BLD AUTO: 9.5 FL (ref 8.1–13.5)
POTASSIUM SERPL-SCNC: 4.8 MMOL/L (ref 3.7–5.3)
RBC # BLD: 3.47 M/UL (ref 3.95–5.11)
RBC # BLD: ABNORMAL 10*6/UL
SEG NEUTROPHILS: 81 % (ref 36–65)
SEGMENTED NEUTROPHILS ABSOLUTE COUNT: 11.79 K/UL (ref 1.5–8.1)
SODIUM BLD-SCNC: 125 MMOL/L (ref 135–144)
TROPONIN INTERP: ABNORMAL
TROPONIN T: ABNORMAL NG/ML
TROPONIN, HIGH SENSITIVITY: 112 NG/L (ref 0–14)
WBC # BLD: 14.5 K/UL (ref 3.5–11.3)
WBC # BLD: ABNORMAL 10*3/UL

## 2020-05-25 PROCEDURE — 36415 COLL VENOUS BLD VENIPUNCTURE: CPT

## 2020-05-25 PROCEDURE — 1200000000 HC SEMI PRIVATE

## 2020-05-25 PROCEDURE — 94660 CPAP INITIATION&MGMT: CPT

## 2020-05-25 PROCEDURE — 6360000002 HC RX W HCPCS: Performed by: INTERNAL MEDICINE

## 2020-05-25 PROCEDURE — 6360000002 HC RX W HCPCS

## 2020-05-25 PROCEDURE — 85025 COMPLETE CBC W/AUTO DIFF WBC: CPT

## 2020-05-25 PROCEDURE — 2580000003 HC RX 258: Performed by: INTERNAL MEDICINE

## 2020-05-25 PROCEDURE — 84484 ASSAY OF TROPONIN QUANT: CPT

## 2020-05-25 PROCEDURE — 94761 N-INVAS EAR/PLS OXIMETRY MLT: CPT

## 2020-05-25 PROCEDURE — 82805 BLOOD GASES W/O2 SATURATION: CPT

## 2020-05-25 PROCEDURE — 93005 ELECTROCARDIOGRAM TRACING: CPT | Performed by: INTERNAL MEDICINE

## 2020-05-25 PROCEDURE — 93010 ELECTROCARDIOGRAM REPORT: CPT | Performed by: INTERNAL MEDICINE

## 2020-05-25 PROCEDURE — 2700000000 HC OXYGEN THERAPY PER DAY

## 2020-05-25 PROCEDURE — 80048 BASIC METABOLIC PNL TOTAL CA: CPT

## 2020-05-25 PROCEDURE — 94640 AIRWAY INHALATION TREATMENT: CPT

## 2020-05-25 RX ORDER — LORAZEPAM 2 MG/ML
1 INJECTION INTRAMUSCULAR EVERY 4 HOURS PRN
Status: DISCONTINUED | OUTPATIENT
Start: 2020-05-25 | End: 2020-05-26 | Stop reason: HOSPADM

## 2020-05-25 RX ORDER — LOSARTAN POTASSIUM 50 MG/1
50 TABLET ORAL DAILY
Status: DISCONTINUED | OUTPATIENT
Start: 2020-05-25 | End: 2020-05-25

## 2020-05-25 RX ORDER — FUROSEMIDE 10 MG/ML
40 INJECTION INTRAMUSCULAR; INTRAVENOUS 2 TIMES DAILY
Status: DISCONTINUED | OUTPATIENT
Start: 2020-05-25 | End: 2020-05-26 | Stop reason: HOSPADM

## 2020-05-25 RX ORDER — LORAZEPAM 2 MG/ML
INJECTION INTRAMUSCULAR
Status: COMPLETED
Start: 2020-05-25 | End: 2020-05-25

## 2020-05-25 RX ORDER — MORPHINE SULFATE 4 MG/ML
4 INJECTION, SOLUTION INTRAMUSCULAR; INTRAVENOUS
Status: DISCONTINUED | OUTPATIENT
Start: 2020-05-25 | End: 2020-05-26 | Stop reason: HOSPADM

## 2020-05-25 RX ORDER — MORPHINE SULFATE 2 MG/ML
2 INJECTION, SOLUTION INTRAMUSCULAR; INTRAVENOUS
Status: DISCONTINUED | OUTPATIENT
Start: 2020-05-25 | End: 2020-05-26 | Stop reason: HOSPADM

## 2020-05-25 RX ORDER — METHYLPREDNISOLONE SODIUM SUCCINATE 125 MG/2ML
60 INJECTION, POWDER, LYOPHILIZED, FOR SOLUTION INTRAMUSCULAR; INTRAVENOUS EVERY 12 HOURS
Status: DISCONTINUED | OUTPATIENT
Start: 2020-05-25 | End: 2020-05-26 | Stop reason: HOSPADM

## 2020-05-25 RX ORDER — LORAZEPAM 2 MG/ML
0.5 INJECTION INTRAMUSCULAR ONCE
Status: COMPLETED | OUTPATIENT
Start: 2020-05-25 | End: 2020-05-25

## 2020-05-25 RX ADMIN — ENOXAPARIN SODIUM 30 MG: 30 INJECTION SUBCUTANEOUS at 08:56

## 2020-05-25 RX ADMIN — FUROSEMIDE 40 MG: 10 INJECTION, SOLUTION INTRAMUSCULAR; INTRAVENOUS at 08:56

## 2020-05-25 RX ADMIN — MORPHINE SULFATE 2 MG: 2 INJECTION, SOLUTION INTRAMUSCULAR; INTRAVENOUS at 20:13

## 2020-05-25 RX ADMIN — MORPHINE SULFATE 2 MG: 2 INJECTION, SOLUTION INTRAMUSCULAR; INTRAVENOUS at 10:54

## 2020-05-25 RX ADMIN — MORPHINE SULFATE 2 MG: 2 INJECTION, SOLUTION INTRAMUSCULAR; INTRAVENOUS at 14:55

## 2020-05-25 RX ADMIN — ALBUTEROL SULFATE 2.5 MG: 2.5 SOLUTION RESPIRATORY (INHALATION) at 19:14

## 2020-05-25 RX ADMIN — ALBUTEROL SULFATE 2.5 MG: 2.5 SOLUTION RESPIRATORY (INHALATION) at 09:10

## 2020-05-25 RX ADMIN — FUROSEMIDE 40 MG: 10 INJECTION, SOLUTION INTRAMUSCULAR; INTRAVENOUS at 18:30

## 2020-05-25 RX ADMIN — LORAZEPAM 0.5 MG: 2 INJECTION INTRAMUSCULAR; INTRAVENOUS at 05:09

## 2020-05-25 RX ADMIN — METHYLPREDNISOLONE SODIUM SUCCINATE 60 MG: 125 INJECTION, POWDER, FOR SOLUTION INTRAMUSCULAR; INTRAVENOUS at 10:54

## 2020-05-25 RX ADMIN — SODIUM CHLORIDE, PRESERVATIVE FREE 10 ML: 5 INJECTION INTRAVENOUS at 20:13

## 2020-05-25 RX ADMIN — LORAZEPAM 0.5 MG: 2 INJECTION INTRAMUSCULAR; INTRAVENOUS at 00:50

## 2020-05-25 RX ADMIN — ALBUTEROL SULFATE 2.5 MG: 2.5 SOLUTION RESPIRATORY (INHALATION) at 16:21

## 2020-05-25 RX ADMIN — METHYLPREDNISOLONE SODIUM SUCCINATE 60 MG: 125 INJECTION, POWDER, FOR SOLUTION INTRAMUSCULAR; INTRAVENOUS at 21:18

## 2020-05-25 RX ADMIN — ALBUTEROL SULFATE 2.5 MG: 2.5 SOLUTION RESPIRATORY (INHALATION) at 05:00

## 2020-05-25 RX ADMIN — LORAZEPAM 0.5 MG: 2 INJECTION INTRAMUSCULAR at 05:09

## 2020-05-25 RX ADMIN — LORAZEPAM 0.5 MG: 2 INJECTION INTRAMUSCULAR; INTRAVENOUS at 04:15

## 2020-05-25 RX ADMIN — SODIUM CHLORIDE, PRESERVATIVE FREE 10 ML: 5 INJECTION INTRAVENOUS at 08:56

## 2020-05-25 ASSESSMENT — PAIN SCALES - GENERAL
PAINLEVEL_OUTOF10: 6
PAINLEVEL_OUTOF10: 4
PAINLEVEL_OUTOF10: 5
PAINLEVEL_OUTOF10: 0

## 2020-05-25 NOTE — PROGRESS NOTES
Patient , Hermes Crockett at bedside. BIPAP removed, heart monitor, and blood pressure cuff. Morphine given due to tachypnea and labored breathing. Patient repositioned for comfort.

## 2020-05-25 NOTE — PROGRESS NOTES
Respirations more labored. C/O SOB. IV restarted in left forearm. Ativan 0.5 mg IVP given. Bi pap applied with 30% FIO2.

## 2020-05-25 NOTE — PROGRESS NOTES
Nolberto Stafford M.D. ICU Progress Note 20    SUBJECTIVE:    Pt seen and examined in the ICU for follow up of acute respiratory failure, possible MI. She was restless on BiPAP at 0500 this AM and was given ativan for BiPAP compliance. Her RR continues in the 30's. She is somnolent now but appears somewhat comfortable on BiPAP. Unable to answer any questions. Moving all 4 extremities. ROS:   Unobtainable due to unresponsive state    OBJECTIVE:    Vitals:   Temp: 97.8 °F (36.6 °C)  Temp range:    Temp  Av.7 °F (36.5 °C)  Min: 97 °F (36.1 °C)  Max: 98.5 °F (36.9 °C)    BP: (!) 166/61  BP Range:      Systolic (80MPW), SDN:724 , Min:107 , RID:566      Diastolic (27PSH), GVB:54, Min:27, Max:116    Pulse: 94  Pulse Range:    Pulse  Av.9  Min: 65  Max: 98    Resp: (!) 33  Resp Range:   Resp  Av.5  Min: 16  Max: 38    SpO2: 93 % on BiPAP  SpO2 range:   SpO2  Av.4 %  Min: 85 %  Max: 98 %    24HR INTAKE/OUTPUT:      Intake/Output Summary (Last 24 hours) at 2020 0917  Last data filed at 2020 0856  Gross per 24 hour   Intake 686 ml   Output 1050 ml   Net -364 ml     -----------------------------------------------------------------  Exam:  GEN:    somnolent but moving all 4 extremities. Responds to tactile stimuli  EYES:  upils equal   NECK: Supple. No lymphadenopathy. No carotid bruit  CVS:    regular rate and rhythm, systolic murmur  PULM:  diminished with little air flow noted, faint basilar rales, mild acute respiratory distress  ABD:    Bowels sounds normal.  Abdomen is soft. No distention. no tenderness to palpation. EXT:   trace edema bilaterally . No calf tenderness. NEURO: Moves all extremities. Motor grossly intact  SKIN:  no mottling. Skin still warm and dry.   Good capillary refill.     -----------------------------------------------------------------  Diagnostic Data:    · All lines, drips, IV sites and medications reviewed  · All available data reviewed  Lab

## 2020-05-25 NOTE — PROGRESS NOTES
Received report on pt at this time. Patient transferred from ICU to [facility] floor. Patient resting comfortably in bed on left side with dry brief. Will continue to monitor. Will continue to provide comfort care.

## 2020-05-25 NOTE — PLAN OF CARE
Pulling bipap off. Attempted high flow oxygen. Continues to pull it off. Oxygen at 3 liters per NC applied. Denies SOB. Respirations labored without Bi Pap on. Refuses to wear Bi Pap. \"I am not wearing that mask. \"  Ativan 0.5 mg IVP given slowly in right IV lock. Continuously pulling oxygen off. Oxygen at 3 liters continued. Lungs diminished with fine rales noted in left posterior base. Denies SOB or pain. Bruising noted in left shoulder, chin, and left arm. Abrasion noted on right shin. Incontinent of urine and stool. Complete bed change done. Diaper applied.

## 2020-05-25 NOTE — PROGRESS NOTES
IM ICU addendum    Called Pt's  and asked him to come to the hospital.   Alisha Eller just arrived. He states he is not surprised at all by pt's turn of events, as she has been sicker and sicker these past couple months. States we are \"waisting our time\" aggressively treating her. States they have talked many times and she would not want aggressive treatment like this. He requests we change her to comfort measures only. Repeat EKG shows worsening lateral ST depressions   Repeat troponin increased from 42 to 112  Pt likely having NSTEMI      Pt's code status changed to [facility] DC'd  Repeat EKG and Troponin cancelled.     Cardiology consult and echo cancelled  If pt survives next few hours, will consult hospice    Irma Mccurdy MD

## 2020-05-25 NOTE — PROGRESS NOTES
Patient was able to verbalize \"I'm hungry\" to writer. Patient's speech is hard to make out and very slurred. Writer was able to help patient reach to a decision of pudding. Writer fed patient half a pudding at this time, long-term through patient was able to verbalize \"that's enough. \" Patient is comfortable in a lower semi-fowlers position on her back at this time. Will continue to monitor.

## 2020-05-25 NOTE — PROGRESS NOTES
Pills held at this time due to patient being somnolent and not able to wake up enough to take her pills. Patient anxious but appears to be comfortable on BIPAP at this time.

## 2020-05-25 NOTE — PROGRESS NOTES
Patient is tachypneic with audible expiratory wheezes in the upper airway. Vital signs are stable at this time. Pt is confused and lethargic but responds to voice.

## 2020-05-25 NOTE — PROGRESS NOTES
Pulled SaO2 sensor off and oxygen. Oxygen saturation 88% on RA. Respirations remain labored at 24. Lungs clear and diminished  With fine rales noted in left posterior base. Remains confused. Reoriented and reassurance given.

## 2020-05-26 ENCOUNTER — HOSPITAL ENCOUNTER (INPATIENT)
Age: 85
LOS: 1 days | Discharge: HOSPICE | DRG: 281 | End: 2020-05-27
Attending: INTERNAL MEDICINE | Admitting: INTERNAL MEDICINE
Payer: COMMERCIAL

## 2020-05-26 VITALS
OXYGEN SATURATION: 93 % | SYSTOLIC BLOOD PRESSURE: 156 MMHG | DIASTOLIC BLOOD PRESSURE: 83 MMHG | BODY MASS INDEX: 27.74 KG/M2 | HEIGHT: 65 IN | WEIGHT: 166.5 LBS | RESPIRATION RATE: 18 BRPM | TEMPERATURE: 98.9 F | HEART RATE: 107 BPM

## 2020-05-26 PROBLEM — I21.4 NSTEMI (NON-ST ELEVATED MYOCARDIAL INFARCTION) (HCC): Status: ACTIVE | Noted: 2020-05-26

## 2020-05-26 PROCEDURE — 94760 N-INVAS EAR/PLS OXIMETRY 1: CPT

## 2020-05-26 PROCEDURE — 6360000002 HC RX W HCPCS: Performed by: NURSE PRACTITIONER

## 2020-05-26 PROCEDURE — 2580000003 HC RX 258: Performed by: INTERNAL MEDICINE

## 2020-05-26 PROCEDURE — 6360000002 HC RX W HCPCS: Performed by: INTERNAL MEDICINE

## 2020-05-26 PROCEDURE — 94664 DEMO&/EVAL PT USE INHALER: CPT

## 2020-05-26 PROCEDURE — 94640 AIRWAY INHALATION TREATMENT: CPT

## 2020-05-26 PROCEDURE — 6370000000 HC RX 637 (ALT 250 FOR IP): Performed by: NURSE PRACTITIONER

## 2020-05-26 PROCEDURE — 1250000000 HC SEMI PRIVATE HOSPICE R&B

## 2020-05-26 RX ORDER — LORAZEPAM 2 MG/ML
1 CONCENTRATE ORAL EVERY 4 HOURS PRN
Status: CANCELLED | OUTPATIENT
Start: 2020-05-26

## 2020-05-26 RX ORDER — SODIUM CHLORIDE 0.9 % (FLUSH) 0.9 %
10 SYRINGE (ML) INJECTION EVERY 12 HOURS SCHEDULED
Status: CANCELLED | OUTPATIENT
Start: 2020-05-26

## 2020-05-26 RX ORDER — ACETAMINOPHEN 650 MG/1
650 SUPPOSITORY RECTAL EVERY 6 HOURS PRN
Status: DISCONTINUED | OUTPATIENT
Start: 2020-05-26 | End: 2020-05-27 | Stop reason: HOSPADM

## 2020-05-26 RX ORDER — FUROSEMIDE 10 MG/ML
40 INJECTION INTRAMUSCULAR; INTRAVENOUS 2 TIMES DAILY
Status: CANCELLED | OUTPATIENT
Start: 2020-05-26

## 2020-05-26 RX ORDER — SODIUM CHLORIDE 0.9 % (FLUSH) 0.9 %
10 SYRINGE (ML) INJECTION PRN
Status: DISCONTINUED | OUTPATIENT
Start: 2020-05-26 | End: 2020-05-27 | Stop reason: HOSPADM

## 2020-05-26 RX ORDER — ALBUTEROL SULFATE 2.5 MG/3ML
2.5 SOLUTION RESPIRATORY (INHALATION) EVERY 4 HOURS PRN
Status: DISCONTINUED | OUTPATIENT
Start: 2020-05-26 | End: 2020-05-26 | Stop reason: HOSPADM

## 2020-05-26 RX ORDER — FUROSEMIDE 10 MG/ML
40 INJECTION INTRAMUSCULAR; INTRAVENOUS 2 TIMES DAILY
Status: DISCONTINUED | OUTPATIENT
Start: 2020-05-26 | End: 2020-05-27 | Stop reason: HOSPADM

## 2020-05-26 RX ORDER — SODIUM CHLORIDE 0.9 % (FLUSH) 0.9 %
10 SYRINGE (ML) INJECTION EVERY 12 HOURS SCHEDULED
Status: DISCONTINUED | OUTPATIENT
Start: 2020-05-26 | End: 2020-05-27 | Stop reason: HOSPADM

## 2020-05-26 RX ORDER — POLYETHYLENE GLYCOL 3350 17 G/17G
17 POWDER, FOR SOLUTION ORAL DAILY PRN
Status: DISCONTINUED | OUTPATIENT
Start: 2020-05-26 | End: 2020-05-27 | Stop reason: HOSPADM

## 2020-05-26 RX ORDER — SODIUM CHLORIDE 0.9 % (FLUSH) 0.9 %
10 SYRINGE (ML) INJECTION PRN
Status: CANCELLED | OUTPATIENT
Start: 2020-05-26

## 2020-05-26 RX ORDER — ONDANSETRON 2 MG/ML
4 INJECTION INTRAMUSCULAR; INTRAVENOUS EVERY 6 HOURS PRN
Status: DISCONTINUED | OUTPATIENT
Start: 2020-05-26 | End: 2020-05-27 | Stop reason: HOSPADM

## 2020-05-26 RX ORDER — POLYETHYLENE GLYCOL 3350 17 G/17G
17 POWDER, FOR SOLUTION ORAL DAILY PRN
Status: CANCELLED | OUTPATIENT
Start: 2020-05-26

## 2020-05-26 RX ORDER — ACETAMINOPHEN 325 MG/1
650 TABLET ORAL EVERY 6 HOURS PRN
Status: CANCELLED | OUTPATIENT
Start: 2020-05-26

## 2020-05-26 RX ORDER — MORPHINE SULFATE 20 MG/ML
5 SOLUTION ORAL
Status: DISCONTINUED | OUTPATIENT
Start: 2020-05-26 | End: 2020-05-27 | Stop reason: HOSPADM

## 2020-05-26 RX ORDER — ACETAMINOPHEN 650 MG/1
650 SUPPOSITORY RECTAL EVERY 6 HOURS PRN
Status: CANCELLED | OUTPATIENT
Start: 2020-05-26

## 2020-05-26 RX ORDER — ONDANSETRON 2 MG/ML
4 INJECTION INTRAMUSCULAR; INTRAVENOUS EVERY 6 HOURS PRN
Status: CANCELLED | OUTPATIENT
Start: 2020-05-26

## 2020-05-26 RX ORDER — MORPHINE SULFATE 20 MG/ML
5 SOLUTION ORAL
Status: CANCELLED | OUTPATIENT
Start: 2020-05-26

## 2020-05-26 RX ORDER — ALBUTEROL SULFATE 2.5 MG/3ML
2.5 SOLUTION RESPIRATORY (INHALATION) EVERY 4 HOURS PRN
Status: DISCONTINUED | OUTPATIENT
Start: 2020-05-26 | End: 2020-05-27 | Stop reason: HOSPADM

## 2020-05-26 RX ORDER — LORAZEPAM 2 MG/ML
1 CONCENTRATE ORAL EVERY 4 HOURS PRN
Status: DISCONTINUED | OUTPATIENT
Start: 2020-05-26 | End: 2020-05-27 | Stop reason: HOSPADM

## 2020-05-26 RX ORDER — PROMETHAZINE HYDROCHLORIDE 25 MG/1
12.5 TABLET ORAL EVERY 6 HOURS PRN
Status: DISCONTINUED | OUTPATIENT
Start: 2020-05-26 | End: 2020-05-27 | Stop reason: HOSPADM

## 2020-05-26 RX ORDER — PROMETHAZINE HYDROCHLORIDE 25 MG/1
12.5 TABLET ORAL EVERY 6 HOURS PRN
Status: CANCELLED | OUTPATIENT
Start: 2020-05-26

## 2020-05-26 RX ORDER — ACETAMINOPHEN 325 MG/1
650 TABLET ORAL EVERY 6 HOURS PRN
Status: DISCONTINUED | OUTPATIENT
Start: 2020-05-26 | End: 2020-05-27 | Stop reason: HOSPADM

## 2020-05-26 RX ORDER — ALBUTEROL SULFATE 2.5 MG/3ML
2.5 SOLUTION RESPIRATORY (INHALATION) 4 TIMES DAILY
Status: DISCONTINUED | OUTPATIENT
Start: 2020-05-26 | End: 2020-05-26 | Stop reason: HOSPADM

## 2020-05-26 RX ORDER — ALBUTEROL SULFATE 2.5 MG/3ML
2.5 SOLUTION RESPIRATORY (INHALATION) EVERY 4 HOURS PRN
Status: CANCELLED | OUTPATIENT
Start: 2020-05-26

## 2020-05-26 RX ADMIN — METHYLPREDNISOLONE SODIUM SUCCINATE 60 MG: 125 INJECTION, POWDER, FOR SOLUTION INTRAMUSCULAR; INTRAVENOUS at 08:23

## 2020-05-26 RX ADMIN — ALBUTEROL SULFATE 2.5 MG: 2.5 SOLUTION RESPIRATORY (INHALATION) at 10:39

## 2020-05-26 RX ADMIN — Medication 1 MG: at 22:42

## 2020-05-26 RX ADMIN — LORAZEPAM 1 MG: 2 INJECTION INTRAMUSCULAR; INTRAVENOUS at 09:30

## 2020-05-26 RX ADMIN — ONDANSETRON 4 MG: 2 INJECTION INTRAMUSCULAR; INTRAVENOUS at 13:54

## 2020-05-26 RX ADMIN — FUROSEMIDE 40 MG: 10 INJECTION, SOLUTION INTRAMUSCULAR; INTRAVENOUS at 08:19

## 2020-05-26 RX ADMIN — MORPHINE SULFATE 2 MG: 2 INJECTION, SOLUTION INTRAMUSCULAR; INTRAVENOUS at 06:51

## 2020-05-26 RX ADMIN — FUROSEMIDE 40 MG: 10 INJECTION, SOLUTION INTRAMUSCULAR; INTRAVENOUS at 17:41

## 2020-05-26 RX ADMIN — ALBUTEROL SULFATE 2.5 MG: 2.5 SOLUTION RESPIRATORY (INHALATION) at 05:32

## 2020-05-26 RX ADMIN — Medication 10 ML: at 09:31

## 2020-05-26 RX ADMIN — SODIUM CHLORIDE, PRESERVATIVE FREE 10 ML: 5 INJECTION INTRAVENOUS at 08:19

## 2020-05-26 RX ADMIN — MORPHINE SULFATE 2 MG: 2 INJECTION, SOLUTION INTRAMUSCULAR; INTRAVENOUS at 13:32

## 2020-05-26 ASSESSMENT — PAIN SCALES - GENERAL
PAINLEVEL_OUTOF10: 7
PAINLEVEL_OUTOF10: 7

## 2020-05-26 NOTE — FLOWSHEET NOTE
Yelling out, confused. C/o pain. Morphine 2 mg IVP given. Vitals checked and assessment done. C/o pain all over. Repositioned in bed, brief dry. Continue to monitor pain.

## 2020-05-26 NOTE — PROGRESS NOTES
[facility] SPECIALTY [facility]  OCCUPATIONAL THERAPY  No Visit Note    [] ICU    [x] Acute   Patient: Nneka Garcia  Room: [facility]37-98      Zen Rodgers not seen on 5/26/2020 at 11:45 AM due to pallative care/hospice consult, patient resting comfortably at this time after being restless. Will attempt evaluation in PM if appropriate at our earliest opportunity.          Signature: Josephus Favre, OTR/L

## 2020-05-26 NOTE — PROGRESS NOTES
Estephanie Oh M.D. Internal Medicine Progress Note 5/26/20    SUBJECTIVE:    Patient seen for f/u of NSTEMI (non-ST elevated myocardial infarction) ([address] She is awake this morning and answers simple questions. She states she feels \"horrible\" but denies pain. This morning she was moaning out and was given morphine 2 mg. She denies SOB but is tachypneic and is showing signs of respiratory distress. She took her NC O2 off and promptly dropped her SpO to 84%. Back up in the 90's with O2 replaced. ROS:   Constitutional: negative  for fevers, and negative for chills. Respiratory: negative for shortness of breath, negative for cough, and negative for wheezing  Cardiovascular: negative for chest pain, and negative for palpitations  Gastrointestinal: negative for abdominal pain, negative for nausea,negative for vomiting, negative for diarrhea, and negative for constipation     All other systems were reviewed with the patient and are negative unless otherwise stated in HPI    OBJECTIVE:  Vitals:   Temp: 97.8 °F (36.6 °C)  BP: (!) 149/71  Resp: 24  Pulse: 104  SpO2: 95 %    24HR INTAKE/OUTPUT:      Intake/Output Summary (Last 24 hours) at 5/26/2020 0803  Last data filed at 5/25/2020 0856  Gross per 24 hour   Intake 10 ml   Output --   Net 10 ml     -----------------------------------------------------------------  Exam:  GEN:    Awake, alert but confused. EYES:  EOMI, pupils equal   NECK: Supple. No lymphadenopathy. No carotid bruit  CVS:    regular rate and rhythm, systolic murmur  PULM:  diminished with scattered rhonchi, mild acute respiratory distress  ABD:    Bowels sounds normal.  Abdomen is soft. No distention. no tenderness to palpation. EXT:   no edema bilaterally . No calf tenderness. NEURO: Moves all extremities. Motor and sensory are grossly intact  SKIN:  No rashes.   No skin lesions.    -----------------------------------------------------------------  Diagnostic Data:    · All available data reviewed  Lab Results   Component Value Date    WBC 14.5 (H) 05/25/2020    HGB 10.8 (L) 05/25/2020    MCV 96.3 05/25/2020     05/25/2020      Lab Results   Component Value Date    GLUCOSE 190 (H) 05/25/2020    BUN 13 05/25/2020    CREATININE 1.73 (H) 05/25/2020     (L) 05/25/2020    K 4.8 05/25/2020    CALCIUM 8.6 05/25/2020    CL 94 (L) 05/25/2020    CO2 19 (L) 05/25/2020     Results for Majo Hong (MRN [id number]) as of 5/26/2020 08:11   Ref.  Range 5/24/2020 16:15 5/25/2020 09:45   Troponin, High Sensitivity Latest Ref Range:   0 - 14 ng/L 42 (H) 112 (HH)         PROBLEM LIST:  Principal Problem:    NSTEMI (non-ST elevated myocardial infarction) ([facility] [address]  Active Problems:    Dehydration with hyponatremia    Altered mental status    Hypoglycemia due to sulfonylurea    Acute kidney injury ([facility] [address]    Mild malnutrition ([facility] [address]    Acute cystitis without hematuria    CAD (coronary artery disease)    History of non-ST elevation myocardial infarction (NSTEMI)    Diabetes mellitus (HCC)        ASSESSMENT / PLAN:  NSTEMI (non-ST elevated myocardial infarction) ([facility] [address]  · More alert today  · Hospice consult today  · Acute respiratory failure with hypoxia and hypercapnea  · Continue O2  · Hypoglycemia due to sulfonylurea  · Glipizide DC'd  · DOMENICO on top of CKD with hyponatremia  · Labs on hold pending hospice consult  · Disposition:  Discharge plan is Malika Farmer M.D.  5/26/2020  8:03 AM

## 2020-05-26 NOTE — H&P
disease)     H/O cardiovascular stress test 11/2017    NORMAL    History of non-ST elevation myocardial infarction (NSTEMI) 03/24/2018    s/p JAYA to LAD    Hyperlipidemia     Hypertension     Unspecified cerebral artery occlusion with cerebral infarction 2003       Past Surgical History:        Procedure Laterality Date    CHOLECYSTECTOMY      COLONOSCOPY      CORONARY ANGIOPLASTY WITH STENT PLACEMENT  2002    CORONARY ANGIOPLASTY WITH STENT PLACEMENT  03/26/2018    Dr. Madeline Sheridan - PCI and JAYA to LAD    EYE SURGERY      LEG SURGERY Bilateral     TOE SURGERY Right 8/21/15    3rd digit skin plasty/Dr. Radhames Parnell. agrawal CRNA/Lila Camposy    TONSILLECTOMY         Family History:   No family history on file. Social History:   TOBACCO:   reports that she has never smoked. She has never used smokeless tobacco.  ETOH:   reports current alcohol use. ELICIT DRUG USE:    Social History     Substance and Sexual Activity   Drug Use Not on file     OCCUPATION: None      Allergies:  Patient has no known allergies. Medications Prior to Admission:    Prior to Admission medications    Medication Sig Start Date End Date Taking?  Authorizing Provider   trospium (SANCTURA) 20 MG tablet Take 1 tablet by mouth 2 times daily 5/14/20   Sigifredo Benedr MD   amLODIPine (NORVASC) 10 MG tablet Take 10 mg by mouth daily     Historical Provider, MD   METOPROLOL TARTRATE PO Take 100 mg by mouth 2 times daily     Historical Provider, MD   ESOMEPRAZOLE MAGNESIUM PO Take 40 mg by mouth daily     Historical Provider, MD   glipiZIDE (GLUCOTROL) 5 MG tablet Take 5 mg by mouth 2 times daily (before meals) Extended release    Historical Provider, MD   aspirin 81 MG tablet Take 81 mg by mouth daily    Historical Provider, MD   metFORMIN (GLUCOPHAGE) 1000 MG tablet Take 1,000 mg by mouth daily (with breakfast)    Historical Provider, MD       Review of Systems:  Patient unable--confused, hallucinating--no family present      Physical Exam: Vitals:                  24HR INTAKE/OUTPUT:  No intake or output data in the 24 hours ending 05/26/20 1518    Exam:  GEN:  alert, disoriented, with anxious affect, frail-appearing and in moderate distress  EYES: No gross abnormalities. , PERRL and EOMI  NECK: normal, supple, no lymphadenopathy,  no carotid bruits  PULM: rhonchi present- scattered and decreased breath sounds noted- throughout  COR: regular rate & rhythm, no gallops, S1 normal and S2 normal  ABD:  soft, non-tender, non-distended, normal bowel sounds, no masses or organomegaly  EXT:   no cyanosis, clubbing or edema present    NEURO: follows commands, ETEINNE, no deficits  SKIN:  no rashes or significant lesions    -----------------------------------------------------------------  Diagnostic Data:   Lab Results   Component Value Date    WBC 14.5 (H) 05/25/2020    HGB 10.8 (L) 05/25/2020     05/25/2020       Lab Results   Component Value Date    BUN 13 05/25/2020    CREATININE 1.73 (H) 05/25/2020     (L) 05/25/2020    K 4.8 05/25/2020    CALCIUM 8.6 05/25/2020    CL 94 (L) 05/25/2020    CO2 19 (L) 05/25/2020    LABGLOM 28 (L) 05/25/2020       Lab Results   Component Value Date    WBCUA 50  05/22/2020    RBCUA 0 TO 2 05/22/2020    EPITHUA 0 TO 2 05/22/2020    LEUKOCYTESUR LARGE (A) 05/22/2020    SPECGRAV 1.010 05/22/2020    GLUCOSEU NEGATIVE 05/22/2020    KETUA NEGATIVE 05/22/2020    PROTEINU NEGATIVE 05/22/2020    HGBUR TRACE (A) 05/22/2020    CASTUA NOT REPORTED 05/22/2020    CRYSTUA NOT REPORTED 05/22/2020    BACTERIA 2+ (A) 05/22/2020    YEAST NOT REPORTED 05/22/2020       Lab Results   Component Value Date    MYOGLOBIN 46 09/06/2012    TROPONINT NOT REPORTED 05/25/2020    CKMB 1.2 09/06/2012    PROBNP 8,271 (H) 05/24/2020       No results found. Assessment:    Active Problems:    * No active hospital problems. *  Resolved Problems:    * No resolved hospital problems.  *      Patient Active Problem List    Diagnosis Date Noted    S/P drug eluting coronary stent placement - LAD 3/26/18 - Dr. Dejon Martinez 03/26/2018     Priority: High    Mild malnutrition ([facility] 04/28/2020     Priority: Medium     Class: Present on Admission    NSTEMI (non-ST elevated myocardial infarction) ([facility] 05/26/2020    Acute kidney injury (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 05/22/2020    Acute cystitis without hematuria 05/22/2020    Dehydration with hyponatremia 03/21/2020    History of non-ST elevation myocardial infarction (NSTEMI) 03/24/2018       Plan:     · This patient requires inpatient admission because of  [facility]--NSTEMI  · Factors affecting the medical complexity of this patient include CAD  · Estimated length of stay is 1 days  · Discharge to [facility] in the morning with Hospice  · [facility]  · Ativan 1 mg oral q4hrs prn for agitation/restlessness  · Morphine Sulfate 5mg oral [id number] prn pain/dyspnea  · Lasix 20 mg IV bid  · High risk medication monitoring: None    CORE MEASURES  DVT prophylaxis: Hospice Care  Decubitus ulcer present on admission: No  CODE STATUS: DNR-CC  Nutrition Status: poor  Physical therapy: NA   Old Charts reviewed: Yes  EKG Reviewed: No  Advance Directive Addressed:  \"Yes - in chart    JEFFREY Dubon NP-C  5/26/2020, 3:18 PM

## 2020-05-26 NOTE — PROGRESS NOTES
Met with family of Patient to discuss options for care as Patient condition is deteriorating and MD recommending hospice care for Patient at this time. Patient's , Verner Bolus, and his sister involved with this meeting. Verner Bolus states that he wishes for Patient to return to [facility] and information provided to them at this time re:  Hospice providers for them to choose from. [facility] is provider of choice for family at this time. Referral made and someone from [facility] will be out to the hospital to meet with the family within the hour, per Regina Bobo from [facility]. Family notified of this. Patient has been at [facility] and on\" for the last few months.  prefers for her to return to [facility] as this has been difficult for their special needs adult son to understand.  relates that son is tearful and not able to understand his mother's decline. PCP is Dr. Ann Marie Gomez. Patient has insurance to assist with medication expenses and  is in the process of applying for Medicaid to cover the room and board expense at the nursing home. Explained to family that hospice is paid through Medicare Part A Benefit. Discharge plan is for [facility] to assess for admission and for Patient to eventually transfer back to [facility] per husbands request.  Ben Hopper with Ancelmo Escobar at [facility] and referral made to [facility] at this time. Ancelmo Escobar will reach out to medical director as he did not want Patient to go home the last time that  took her home just a few days ago. Patient is a '[facility]' at this time. LSW to be available to assist with discharge planning as appropriate.     Tio. Alexx Chu 69Denita  5/26/2020

## 2020-05-26 NOTE — CONSULTS
Palliative Care Notes    Reason for Consult:  goals of care and hospice discussion    Patient Active Problem List   Diagnosis    S/P drug eluting coronary stent placement - LAD 3/26/18 - Dr. Cesar Richard Dehydration with hyponatremia    Mild malnutrition ([facility]    Altered mental status    Hypoglycemia due to sulfonylurea    Acute cystitis without hematuria    [facility]    CAD (coronary artery disease)    History of non-ST elevation myocardial infarction (NSTEMI)    Diabetes mellitus ([facility]    NSTEMI (non-ST elevated myocardial infarction) ([facility]       Advance Directives:  Code status: DNR-CC  Patient has capacity for medical decisions: no  Health Care Power of : yes  Living Will: yes        Pain Management:  Pain is controlled with current analgesics. Medication(s) being used: narcotic analgesics including morphine. Symptom Management:  Are there any other symptoms that are distressing to the patient or family that needs addressed? Anxiety:  psychomotor agitation, visual hallucinations                          Dyspnea:  none                          Fatigue:  exercise intolerance                          Bladder function:  incontinent                          Bowel function:  incontinent    Other: peripheral edema     Spiritual history/needs:   notified: Visited with patient and  earlier today    Palliative Performance Scale:  ___70%  Ambulation reduced; Some disease; Can't do normal job or work; intake normal or reduced; can do full self care; LOC full  ___60%  Ambulation reduced; Significant disease; Can't do hobbies/housework; intake normal or reduced; occasional assist; LOC full/confusion  ___50%  Mainly sit/lie;  Extensive disease; Can't do any work; Considerable assist; intake normal or reduced; LOC full/confusion  ___40%  Mainly in bed; Extensive disease; Mainly assist; intake normal or reduced; LOC full/confusion ___30%  Bed Bound; Extensive disease; Total care; intake reduced; LOC full/confusion  _x__20%  Bed Bound; Extensive disease; Total care; intake minimal; Drowsy/coma  ___10%  Bed Bound; Extensive disease; Total care; Mouth care only; Drowsy/coma  ___0       Death    Readmission Risk:  20%    Notes:  Escboar Parker is resting in bed during our visit, occasionally restless and calls out. Her , Usman Bal is at the bedside. She presented to the emergency department with confusion and hypoglycemia. She has a history of CAD, diabetes, STEMI and CVA. Escobar Parker has been in out of [facility] in the last few months. Her  states that she returned home less than 2 weeks prior to this admission. Usman Bal states that it has been difficult for their special needs son to understand what is happening. Usman Bal is tearful. Support provided. Miguel Ángel's sister is in the waiting room and is a support for him. During this admission Escobar Parker suffered a NSTEMI and Dr Florence Murguia discussed Hospice with Usman Bal. Usman Bal and his sister met with [facility] earlier today. Usman Bal wishes for Escobar Parker to return to [facility] with Hospice care at discharge. Sweta Hoffman accepted patient at this time.  is awaiting a response from [facility]. Usman Bal is encouraged to reminisce about his life with Escobar Parker. States that they have had a very good FDC together. Support provided. Escobar Parker has been having issues with pain and her primary nurse states that she has been hallucinating, seeing mice in the room and talking about the [place]. When writer entered room, Usman Bal was giving Escobar Parker a drink of pop at her request. She then complains of nausea and IV Zofran is given. She is difficult to understand at this time as she does not have her dentures in. Denies pain except for a headache before falling asleep, IV morphine given approximately 30 minutes ago. Ativan IV was given this morning for restlessness which allowed her to rest peacefully.  Usman Bal states that he is going to leave

## 2020-05-26 NOTE — DISCHARGE SUMMARY
utilized to reduce the radiation dose to as low as reasonably achievable. COMPARISON: CT head 08/27/2010 HISTORY: ORDERING SYSTEM PROVIDED HISTORY: Fall TECHNOLOGIST PROVIDED HISTORY: Fall FINDINGS: BRAIN/VENTRICLES: There is no acute intracranial hemorrhage, mass effect or midline shift. No abnormal extra-axial fluid collection. The gray-white differentiation is maintained without evidence of an acute infarct. There is no evidence of hydrocephalus. Mild-to-moderate diffuse cerebral volume loss with proportionate prominence of the ventricles and sulci, progressive since 2010. Mild asymmetry between the lateral ventricles is also somewhat similar and probably within normal variation limits. Progressive moderate confluent low attenuation in the subcortical, periventricular and deep white matter likely reflects sequela of chronic microvascular ischemia. Remote lacunar infarcts in the basal ganglia. ORBITS: The visualized portion of the orbits demonstrate no acute abnormality. SINUSES: The visualized paranasal sinuses and mastoid air cells demonstrate no acute abnormality. SOFT TISSUES/SKULL:  No acute abnormality of the visualized skull or soft tissues. No acute intracranial abnormality. Progressive age-related cerebral atrophy and chronic white matter microvascular disease since 2010. Ct Cervical Spine Wo Contrast    Result Date: 5/24/2020  EXAMINATION: CT OF THE CERVICAL SPINE WITHOUT CONTRAST 5/24/2020 3:02 am TECHNIQUE: CT of the cervical spine was performed without the administration of intravenous contrast. Multiplanar reformatted images are provided for review. Dose modulation, iterative reconstruction, and/or weight based adjustment of the mA/kV was utilized to reduce the radiation dose to as low as reasonably achievable. COMPARISON: None. HISTORY: ORDERING SYSTEM PROVIDED HISTORY: Fall TECHNOLOGIST PROVIDED HISTORY: Fall FINDINGS: BONES/ALIGNMENT: Mild motion artifact.   Vertebral body heights are surrogate about current care plan  Coordination with Case Management and/or   Coordination of care with Consultants (if applicable)   Coordination of care with Receiving Facility Physician (if applicable)  Completion of DME forms (if applicable)  Preparation of Discharge Summary  Preparation of Medication Reconciliation  Preparation of Discharge Prescriptions    Signed:  JEFFREY Walker, NP-C  5/26/2020, 2:21 PM

## 2020-05-26 NOTE — PROGRESS NOTES
RESPIRATORY ASSESSMENT PROTOCOL                                                                                              Patient Name: Zen Rodgers Room#: [id number] : 1935     Admitting diagnosis: Hypoglycemia [E16.2]       Medical History:   Past Medical History:   Diagnosis Date    Arthritis     CAD (coronary artery disease)     s/p stent placements in  and     Clinical trial participant at discharge 2018     disqualified    [facility] ([facility]     GERD (gastroesophageal reflux disease)     H/O cardiovascular stress test 2017    NORMAL    History of non-ST elevation myocardial infarction (NSTEMI) 2018    s/p JAYA to LAD    Hyperlipidemia     Hypertension     Unspecified cerebral artery occlusion with cerebral infarction        PATIENT ASSESSMENT    LABORATORY DATA  Hematology:   Lab Results   Component Value Date    WBC 14.5 2020    RBC 3.47 2020    HGB 10.8 2020    HCT 33.4 2020     2020     Chemistry:    Lab Results   Component Value Date    PHART 7.171 2020    SBQ9OEL 45.5 2020    PO2ART 111.8 2020    W7QWACFW 96.9 2020    RGB0DHB 16.3 2020    PBEA NOT REPORTED 2020       Blood Culture:   Sputum Culture:     VITALS  Pulse: 96   Resp: 20  BP: (!) 148/75  SpO2: 93 % O2 Device: Nasal cannula  Temp: 98.1 °F (36.7 °C)  Comment:     SKIN COLOR  [x] Normal  [] Pale  [] Dusky  [] Cyanotic      RESPIRATORY PATTERN  [] Normal  [x] Dyspnea  [] Cheyne-Whalen  [] Kussmaul  [] Biots    AMBULATORY  [] Yes  [x] No  [] With Assistance    PEAK FLOW  Predicted:     Personal Best:        VITAL CAPACITY  Predicted value:  ml  Actual Value:  ml  30% of Predicted:  Ml    Patient Acuity 0 1 2 3 4 Score   Level of Concious (LOC) []  Alert & Oriented or Pt normal LOC [x]  Confused;follows directions []  Confused & uncooper-ative []  Obtunded []  Comatose 1   Respiratory Rate  (RR) []  Reg. rate & pattern. 12 - 20 bpm  []  Increased RR. Greater than 20 bpm   [x]  SOB w/ exertion or RR greater than 24 bpm []  Access- ory muscle use at rest. Abn.  resp. []  SOB at rest.   2   Bilateral Breath Sounds (BBS) []  Clear []  Diminish-ed bases  []  Diminish-ed t/o, or rales   [x]  Sporadic, scattered wheezes or rhonchi []  Persistentwheezes and, or absent BBS 3   Cough [x]  Strong, effective, & non-prod. []  Effective & prod. Less than 25 ml (2 TBSP) over past 24 hrs []  Ineffective & non-prod to less than 25 ML over past 24 hrs []  Ineffective and, or greater than 25 ml sputum prod. past 24 hrs. []  Nonspon- taneous; Requires suctioning 0   Pulmonary History  (PULM HX) []  No smoking and no chronic pulmonaryhistory []  Former smoker. Quit over 12 mos. ago []  Current smoker or quit w/ in 12 mos [x]  Pulm. History and, or 20 pk/yr smoking hx []  Admitted w/ acute pulm. dx and, or has been admitted w/ pulm. dx 2 or more times over past 12 mos 3   Surgical History this Admit  (SURG HX) [x]  No surgery []  General surgery []  Lower abdominal []  Thoracic or upper abdominal   []  Thoracic w/ pulm. disease 0   Chest X-Ray (CXR)/CT Scan []  Clear or not applicable []  Not available []  Atelect- asis or pleural effusions [x]  Localized infiltrate or pulm. edema []  Con-solidated Infiltrates, bilateral, or in more than 1 lobe 3   Slow or Forced VC, FEV1 OR PEFR (PULM FXN)  [x]  80% or greater, or not indicated []  Pt. unable to perform []  FEV1 or PEFR or VC 51-79%.   []  FEV1 or PEFR or VC  30-49%   []  FEV1 or PEFR or VC less than 30%   0   TOTAL ACUITY: 11       CARE PLAN    If Acuity Level is 2, 3, or 4 in any of the following:    [x] BILATERAL BREATH SOUNDS (BBS)     [x] PULMONARY HISTORY (PULM HX)  [] PULMONARY FUNCTION (PULM FX)    Goal: Improve respiratory functions in patients with airway disease and decrease WOB    [x] AEROSOL PROTOCOL    Total Acuity:   16-32  []  Secondary Assessment in 24 hrs Total in the following:    [] PULMONARY HISTORY (PULM HX)    Goal: Assist patient in quitting smoking to slow or stop the progression of lung disease.     [] Smoking Cessation Protocol    SMOKING CESSATION EDUCATION provided according to policy [id number]_242: (oliva with an X)  ____Yes    ____ No     ____ NA    Smoking Cessation Booklet given:  ____Yes  ____No ____Patient Efraim Crigler

## 2020-05-27 VITALS
TEMPERATURE: 98.7 F | DIASTOLIC BLOOD PRESSURE: 75 MMHG | SYSTOLIC BLOOD PRESSURE: 154 MMHG | HEART RATE: 104 BPM | WEIGHT: 163.8 LBS | RESPIRATION RATE: 24 BRPM | OXYGEN SATURATION: 93 % | BODY MASS INDEX: 27.26 KG/M2

## 2020-05-27 PROCEDURE — 6370000000 HC RX 637 (ALT 250 FOR IP): Performed by: NURSE PRACTITIONER

## 2020-05-27 PROCEDURE — 2700000000 HC OXYGEN THERAPY PER DAY

## 2020-05-27 PROCEDURE — 6360000002 HC RX W HCPCS: Performed by: NURSE PRACTITIONER

## 2020-05-27 PROCEDURE — 2580000003 HC RX 258: Performed by: NURSE PRACTITIONER

## 2020-05-27 PROCEDURE — 94640 AIRWAY INHALATION TREATMENT: CPT

## 2020-05-27 RX ORDER — LORAZEPAM 2 MG/ML
1 CONCENTRATE ORAL EVERY 4 HOURS PRN
Qty: 30 ML | Refills: 0 | Status: SHIPPED | OUTPATIENT
Start: 2020-05-27 | End: 2020-06-10

## 2020-05-27 RX ORDER — MORPHINE SULFATE 20 MG/ML
5 SOLUTION ORAL
Qty: 15 ML | Refills: 0 | Status: SHIPPED | OUTPATIENT
Start: 2020-05-27 | End: 2020-06-01

## 2020-05-27 RX ADMIN — Medication 10 ML: at 09:45

## 2020-05-27 RX ADMIN — ALBUTEROL SULFATE 2.5 MG: 2.5 SOLUTION RESPIRATORY (INHALATION) at 04:49

## 2020-05-27 RX ADMIN — Medication 5 MG: at 09:43

## 2020-05-27 RX ADMIN — FUROSEMIDE 40 MG: 10 INJECTION, SOLUTION INTRAMUSCULAR; INTRAVENOUS at 09:43

## 2020-05-27 RX ADMIN — Medication 5 MG: at 03:40

## 2020-05-27 RX ADMIN — Medication 5 MG: at 01:08

## 2020-05-27 ASSESSMENT — PAIN DESCRIPTION - PAIN TYPE: TYPE: ACUTE PAIN

## 2020-05-27 ASSESSMENT — PAIN SCALES - GENERAL
PAINLEVEL_OUTOF10: 4
PAINLEVEL_OUTOF10: 0
PAINLEVEL_OUTOF10: 1
PAINLEVEL_OUTOF10: 6
PAINLEVEL_OUTOF10: 0

## 2020-05-27 ASSESSMENT — PAIN DESCRIPTION - DESCRIPTORS: DESCRIPTORS: PATIENT UNABLE TO DESCRIBE

## 2020-05-27 ASSESSMENT — PAIN DESCRIPTION - LOCATION: LOCATION: OTHER (COMMENT)

## 2020-05-27 NOTE — PROGRESS NOTES
MEDICAL NUTRITION THERAPY- SCREENING   Yosi Deleon is a 81 y.o.  female     Admission Date: 5/26/2020    Principal Problem:  Hypyoglycemia    Patient nutritionally screened per comfort care plan / hospice. Current intakes on General with 1800 f/r diet are poor. Food and fluid as aids comfort is recommended. Body mass index is 27.26 kg/m². indicating Overweight. Clinical nutrition services will continue to be available as indicated or desired by patient or family. Follow-up and re-evaluate as needed.     Electronically signed by Benjie Chavez RD, LD on 5/27/2020 at 7:51 AM

## 2020-05-27 NOTE — PROGRESS NOTES
Report given to [facility]; aware that p/u is scheduled for [id number]. Writer discussed reason for NPO status to nurse and that they can reassess with change in patient status. No questions at this time.  Direct number given if has questions after arrival.

## 2020-05-27 NOTE — DISCHARGE SUMMARY
PERRL and EOMI  NECK: normal, supple, no lymphadenopathy,  no carotid bruits  PULM: clear to auscultation bilaterally- no wheezes, rales or rhonchi, normal air movement, no respiratory distress  COR: regular rate & rhythm, no murmurs, no gallops, S1 normal and S2 normal  ABD:  soft, non-tender, non-distended, normal bowel sounds, no masses or organomegaly  EXT:   no cyanosis, clubbing or edema present    NEURO: awakes to stimulation, but goes back to sleep quickly. SKIN:  no rashes or significant lesions    Significant Diagnostic Studies:   Lab Results   Component Value Date    WBC 14.5 (H) 05/25/2020    HGB 10.8 (L) 05/25/2020     05/25/2020       Lab Results   Component Value Date    BUN 13 05/25/2020    CREATININE 1.73 (H) 05/25/2020     (L) 05/25/2020    K 4.8 05/25/2020    CALCIUM 8.6 05/25/2020    CL 94 (L) 05/25/2020    CO2 19 (L) 05/25/2020    LABGLOM 28 (L) 05/25/2020       Lab Results   Component Value Date    WBCUA 50  05/22/2020    RBCUA 0 TO 2 05/22/2020    EPITHUA 0 TO 2 05/22/2020    LEUKOCYTESUR LARGE (A) 05/22/2020    SPECGRAV 1.010 05/22/2020    GLUCOSEU NEGATIVE 05/22/2020    KETUA NEGATIVE 05/22/2020    PROTEINU NEGATIVE 05/22/2020    HGBUR TRACE (A) 05/22/2020    CASTUA NOT REPORTED 05/22/2020    CRYSTUA NOT REPORTED 05/22/2020    BACTERIA 2+ (A) 05/22/2020    YEAST NOT REPORTED 05/22/2020       No results found.     Assessment and Plan:  Patient Active Problem List    Diagnosis Date Noted    S/P drug eluting coronary stent placement - LAD 3/26/18 - Dr. Sara Erickson 03/26/2018     Priority: High    Mild malnutrition ([facility] 04/28/2020     Priority: Medium     Class: Present on Admission    NSTEMI (non-ST elevated myocardial infarction) ([facility] 05/26/2020    Acute kidney injury ([facility] 05/24/2020    CAD (coronary artery disease)     Diabetes mellitus ([facility]     Altered mental status 05/22/2020    Hypoglycemia due to sulfonylurea 05/22/2020    Acute cystitis without hematuria forms (if applicable)  Preparation of Discharge Summary  Preparation of Medication Reconciliation  Preparation of Discharge Prescriptions    Signed:  JEFFREY Menendez, NP-C  5/27/2020, 9:16 AM

## 2020-05-27 NOTE — DISCHARGE INSTR - COC
Continuity of Care Form    Patient Name: Tonya Schroeder   :  1935  MRN:  [id number]    Admit date:  2020  Discharge date:  2020    Code Status Order: DNR-CC   Advance Directives:     Admitting Physician:  Odilon Vasquez MD  PCP: Bella Smith MD    Discharging Nurse: [facility] Unit/Room#: 0320/0320-01  Discharging Unit Phone Number: [phone]    Emergency Contact:   Extended Emergency Contact Information  Primary Emergency Contact: Peterson Garcia  Address: [address], [address] [place] [address] Phone: [phone]  Relation: Spouse    Past Surgical History:  Past Surgical History:   Procedure Laterality Date    CHOLECYSTECTOMY      COLONOSCOPY      CORONARY ANGIOPLASTY WITH STENT PLACEMENT      CORONARY ANGIOPLASTY WITH STENT PLACEMENT  2018    Dr. Luis Bacon - PCI and JAYA to LAD    EYE SURGERY      LEG SURGERY Bilateral     TOE SURGERY Right 8/21/15    3rd digit skin plasty/Dr. Damien Mcneal. agrawal CRNA/Lila Juan    TONSILLECTOMY         Immunization History: There is no immunization history for the selected administration types on file for this patient.     Active Problems:  Patient Active Problem List   Diagnosis Code    S/P drug eluting coronary stent placement - LAD 3/26/18 - Dr. 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    Acute cystitis without hematuria N30.00    Acute kidney injury ([address] N17.9    CAD (coronary artery disease) I25.10    History of non-ST elevation myocardial infarction (NSTEMI) I25.2    Diabetes mellitus (HCC) E11.9    NSTEMI (non-ST elevated myocardial infarction) ([address] I21.4       Isolation/Infection:   Isolation          No Isolation        Patient Infection Status     None to display          Nurse Assessment:  Last Vital Signs: BP (!) 154/75   Pulse 104   Temp 98.7 °F (37.1 °C) (Temporal)   Resp 24   Wt 163 lb 12.8 oz (74.3 kg)   SpO2 97%   BMI 27.26 kg/m²     Last documented pain score (0-10 scale): Pain Level: 0  Last Weight:   Wt Readings from Last 1 Encounters:   05/27/20 163 lb 12.8 oz (74.3 kg)     Mental Status:  alert and incomprehensible speech most times. IV Access:  - None    Nursing Mobility/ADLs:  Walking   Dependent  Transfer  Dependent  Bathing  Dependent  Dressing  Dependent  Toileting  Dependent  Feeding  Dependent  Med Admin  Dependent  Med Delivery   none    Wound Care Documentation and Therapy:  Incision 08/21/15 Toe (Comment  which one) (Active)   Number of days: 9305     Scattered bruising     Elimination:  Continence:   · Bowel: No  · Bladder: No  Urinary Catheter: None   Colostomy/Ileostomy/Ileal Conduit: No       Date of Last BM: 5/24    Intake/Output Summary (Last 24 hours) at 5/27/2020 1055  Last data filed at 5/27/2020 0943  Gross per 24 hour   Intake 40 ml   Output --   Net 40 ml     No intake/output data recorded. Safety Concerns: At Risk for Falls and Aspiration Risk    Impairments/Disabilities:      Speech    Nutrition Therapy:  Current Nutrition Therapy:   - Oral Diet:  NPO  - D/T possible aspiration with oral intake. Routes of Feeding: Oral  Liquids: No Liquids  Daily Fluid Restriction: no  Last Modified Barium Swallow with Video (Video Swallowing Test): not done    Treatments at the Time of Hospital Discharge:   Respiratory Treatments: None  Oxygen Therapy:  is on oxygen at 3 L/min per nasal cannula. Ventilator:    - No ventilator support    Rehab Therapies: None  Weight Bearing Status/Restrictions: No weight bearing restirctions  Other Medical Equipment (for information only, NOT a DME order):  None  Other Treatments: Reassess diet restrictions with change in patient status.     Patient's personal belongings (please select all that are sent with patient):  Clothes    RN SIGNATURE:  Electronically signed by Cari Hong RN on

## 2020-05-27 NOTE — PROGRESS NOTES
Progress Note    SUBJECTIVE:  Hospice Care for NSTEMI    OBJECTIVE:  Resting in bed eyes closed labored respirations with 4L of O2 per NC. Opens eyes to stimulation. States she is having \"a little bit of pain\" when asked. Does not have other complaints at this time. Vitals:   Vitals:    05/27/20 0449   BP:    Pulse:    Resp: 14   Temp:    SpO2: (!) 88%     Weight: 163 lb 12.8 oz (74.3 kg)       -----------------------------------------------------------------  Exam:    CONSTITUTIONAL:  Eyes open, answers to name, resps labored, frail-appearing   EYES:  Lids and lashes normal, pupils equal, round and reactive to light, extra ocular muscles intact, sclera clear, conjunctiva normal  ENT:  normocepalic, without obvious abnormality, atraumatic  NECK:  supple, symmetrical, trachea midline, skin normal and no stridor  HEMATOLOGIC/LYMPHATICS:  no cervical lymphadenopathy and no supraclavicular lymphadenopathy  LUNGS:  tachypneic, mild air exchange and clear to auscultation  CARDIOVASCULAR:  Normal apical impulse, regular rate and rhythm, normal S1 and S2, no S3 or S4, and no murmur noted  ABDOMEN:  No scars, normal bowel sounds, soft, non-distended, non-tender, no masses palpated, no hepatosplenomegally  MUSCULOSKELETAL:  there is no redness, warmth, or swelling of the joints  full range of motion noted  tone is normal  NEUROLOGIC:  Opens eyes to stimulation, does not converse, but answers questions if prompted, cooperative  SKIN:  Pale, bruising to chin, arms. No open areas  EXT:     no cyanosis, clubbing or edema present    -----------------------------------------------------------------  Diagnostic Data: Reviewed    ASSESSMENT:   Active Problems:    NSTEMI (non-ST elevated myocardial infarction) ([facility]  Resolved Problems:    * No resolved hospital problems.  *        PLAN:  · [address]  · Plan is to discharge today to [facility] with [facility]  · Continue oral MS and Ativan arunan      William Conn , APRN, NP-C

## 2020-05-27 NOTE — PROGRESS NOTES
Patient coughing after only a few sips of water. Her RR is already increased with audible wheezing noted. Patient will be made NPO d/t this assessment and writer will make CNP and accepting facility aware.

## 2020-05-27 NOTE — PROGRESS NOTES
Pt continues to be restless with labored breathing. Morphine 5mg given at this time. Pt repositioned and brief changed at this time. Will continue to monitor.

## 2020-05-27 NOTE — PROGRESS NOTES
Pt unable to tolerate breathing treatment. Only received half of dose. Continues to take oxygen off. Reapplied at this time. Will continue to monitor.

## 2020-05-29 LAB
GLUCOSE BLD-MCNC: 28 MG/DL (ref 74–100)
GLUCOSE BLD-MCNC: 38 MG/DL (ref 74–100)